# Patient Record
Sex: MALE | Race: BLACK OR AFRICAN AMERICAN
[De-identification: names, ages, dates, MRNs, and addresses within clinical notes are randomized per-mention and may not be internally consistent; named-entity substitution may affect disease eponyms.]

---

## 2018-01-17 ENCOUNTER — HOSPITAL ENCOUNTER (INPATIENT)
Dept: HOSPITAL 74 - YASAS | Age: 63
LOS: 4 days | Discharge: HOME | End: 2018-01-21
Attending: INTERNAL MEDICINE | Admitting: INTERNAL MEDICINE
Payer: COMMERCIAL

## 2018-01-17 VITALS — BODY MASS INDEX: 22.1 KG/M2

## 2018-01-17 DIAGNOSIS — G89.29: ICD-10-CM

## 2018-01-17 DIAGNOSIS — Z91.14: ICD-10-CM

## 2018-01-17 DIAGNOSIS — F10.230: Primary | ICD-10-CM

## 2018-01-17 DIAGNOSIS — F17.213: ICD-10-CM

## 2018-01-17 DIAGNOSIS — F14.20: ICD-10-CM

## 2018-01-17 DIAGNOSIS — I10: ICD-10-CM

## 2018-01-17 DIAGNOSIS — M54.5: ICD-10-CM

## 2018-01-17 LAB
APPEARANCE UR: CLEAR
BILIRUB UR STRIP.AUTO-MCNC: NEGATIVE MG/DL
COLOR UR: YELLOW
KETONES UR QL STRIP: (no result)
LEUKOCYTE ESTERASE UR QL STRIP.AUTO: NEGATIVE
NITRITE UR QL STRIP: NEGATIVE
PH UR: 6 [PH] (ref 5–8)
PROT UR QL STRIP: NEGATIVE
PROT UR QL STRIP: NEGATIVE
RBC # UR STRIP: NEGATIVE /UL
SP GR UR: 1.02 (ref 1–1.03)
UROBILINOGEN UR STRIP-MCNC: (no result) MG/DL (ref 0.2–1)

## 2018-01-17 PROCEDURE — HZ2ZZZZ DETOXIFICATION SERVICES FOR SUBSTANCE ABUSE TREATMENT: ICD-10-PCS | Performed by: INTERNAL MEDICINE

## 2018-01-17 RX ADMIN — Medication SCH MG: at 22:09

## 2018-01-17 RX ADMIN — AMLODIPINE BESYLATE SCH MG: 10 TABLET ORAL at 15:58

## 2018-01-17 RX ADMIN — HYDROCHLOROTHIAZIDE SCH MG: 25 TABLET ORAL at 15:58

## 2018-01-17 NOTE — EKG
Test Reason : 

Blood Pressure : ***/*** mmHG

Vent. Rate : 060 BPM     Atrial Rate : 060 BPM

   P-R Int : 148 ms          QRS Dur : 100 ms

    QT Int : 484 ms       P-R-T Axes : 071 -21 -70 degrees

   QTc Int : 484 ms

 

NORMAL SINUS RHYTHM

POSSIBLE LEFT ATRIAL ENLARGEMENT

LEFT VENTRICULAR HYPERTROPHY

CANNOT RULE OUT ANTEROSEPTAL INFARCT , AGE UNDETERMINED

ABNORMAL ECG

WHEN COMPARED WITH ECG OF 07-NOV-2017 10:02,

MINIMAL CRITERIA FOR ANTEROSEPTAL INFARCT ARE NOW PRESENT

Confirmed by CALEB SETH, VAN ([id number]) on 1/17/2018 6:45:08 PM

 

Referred By:             Confirmed By:VAN ELLIS MD

## 2018-01-17 NOTE — HP
CIWA Score





- CIWA Score


Nausea/Vomitin-No Nausea/No Vomiting


Muscle Tremors: 3


Anxiety: 4-Mod. Anxious/Guarded


Agitation: 0-Normal Activity


Paroxysmal Sweats: 3


Orientation: 0-Oriented


Tacttile Disturbances: 1-Very Mild Itch/Numbness


Auditory Disturbances: 0-None


Visual Disturbances: 2-Mild Sensitivity


Headache: 4-Moderately Severe


CIWA-Ar Total Score: 17





Admission ROS [facility]





- HPI


Chief Complaint: 





"I would like to stop drinking."


Pt. is here to Detox from Alcohol.


Allergies/Adverse Reactions: 


 Allergies











Allergy/AdvReac Type Severity Reaction Status Date / Time


 


No Known Allergies Allergy   Verified 18 11:40











History of Present Illness: 





Pt. is a 63 YO male here to Detox from Alcohol. Patient has had several 

previous Detox Admissions at [facility] in the past (most recent: 2017). Longest 

period of sobriety in recent years: approx. 20 days (2017).


Exam Limitations: No Limitations





- Ebola screening


Have you traveled outside of the country in the last 21 days: No


Have you had contact with anyone from an Ebola affected area: No


Have you been sick,other than usual withdrawal symptoms: No


Do you have a fever: No





- Review of Systems


Constitutional: Chills, Diaphoresis, Fever, Malaise, Night Sweats, Unexplained 

wgt Loss (Lost approx. 10 lbs. over last 2 months.)


EENT: reports: Blurred Vision, Tearing, Nose Congestion, Sinus Pressure


Respiratory: reports: Productive cough


Cardiac: reports: No Symptoms Reported


GI: reports: Nausea, Vomiting, Indigestion


: reports: Frequency (Currently takes HCTZ for HTN.)


Musculoskeletal: reports: Back Pain, Joint Pain, Muscle Pain, Neck Pain, Joint 

Stiffness


Integumentary: reports: No Symptoms Reported


Neuro: reports: Headache, Numbness (Bilateral hands.), Tingling (Bilateral 

hands.), Tremors


Endocrine: reports: No Symptoms Reported


Hematology: reports: Anemia (Not sure about type.)


Psychiatric: reports: Judgement Intact, Mood/Affect Appropiate, Orientated x3, 

Anxious


Other Systems: Reviewed and Negative





Patient History





- Patient Medical History


Hx Anemia: Yes (Uncertin about type; No previous Treatment.)


Hx Asthma: No


Hx Chronic Obstructive Pulmonary Disease (COPD): No


Hx Cancer: No


Hx Cardiac Disorders: No


Hx Congestive Heart Failure: No


Hx Hypertension: Yes (NON COMPLIANT WITH MEDS.)


Hx Hypercholesterolemia: Yes (In past, not taking any current medications.)


Hx Pacemaker: No


HX Cerebrovascular Accident: No


Hx Seizures: No


Hx Dementia: No


Hx Diabetes: No


Hx Gastrointestinal Disorders: No


Hx Liver Disease: No


Hx Genitourinary Disorders: No


Hx Sexually Transmitted Disorders: No


Hx Renal Disease (ESRD): No


Hx Thyroid Disease: No


Hx Human Immunodeficiency Virus (HIV): No (Last Tested: approx. 5 months ago: 

NEGATIVE.)


Hx Hepatitis C: No (Last Tested: approx. 1 month ago: NEGATIVE.)


Hx Depression: No


Hx Suicide Attempt: No (PATIENT DENIES CURRENT SI / HI.)


Hx Bipolar Disorder: No


Hx Schizophrenia: No


Other Medical History: DENIES.





- Patient Surgical History


Past Surgical History: No


Hx Neurologic Surgery: No


Hx Cataract Extraction: No


Hx Cardiac Surgery: No


Hx Lung Surgery: No


Hx Breast Surgery: No


Hx Breast Biopsy: No


Hx Abdominal Surgery: No


Hx Appendectomy: No


Hx Cholecystectomy: No


Hx Genitourinary Surgery: No


Hx  Section: No


Hx Orthopedic Surgery: No


Anesthesia Reaction: No





- PPD History


Previous Implant?: Yes


Documented Results: Negative w/proof


Implanted On Prior [facility] Admission?: Yes


Date: 17


Results: 0 MM


PPD to be Administered?: No





- Reproductive History


Patient is a Female of Child Bearing Age (11 -55 yrs old): No (PATIENT IS MALE.)





- Smoking Cessation


Smoking history: Current every day smoker


Have you smoked in the past 12 months: Yes


Aproximately how many cigarettes per day: 20


Cigars Per Day: 0


Hx Chewing Tobacco Use: No


Initiated information on smoking cessation: Yes


'Breaking Loose' booklet given: 18 (GIVEN TO PATIENT.)





- Substance & Tx. History


Hx Alcohol Use: Yes


Hx Substance Use: Yes


Substance Use Type: Alcohol, Cocaine, Marijuana


Hx Substance Use Treatment: Yes (Previous Detox Admissions at [facility] (most recent

: 2017).)





- Substances Abused


  ** Alcohol


Route: Oral


Frequency: Daily


Amount used: 12 PK BEER / 1 PINT OF VODKA OR RUM


Age of first use: 16


Date of Last Use: 18





  ** Cocaine


Route: Inhalation


Frequency: Daily


Amount used: 2-3 GRAMS


Age of first use: 19


Date of Last Use: 18





  ** Marijuana/Hashish


Route: Smoking


Frequency: Daily


Amount used: 1/8 OF AN OUNCE


Age of first use: 17


Date of Last Use: 18





Family Disease History





- Family Disease History


Family Disease History: Heart Disease: Mother (CVA; .), Other: Mother





Admission Physical Exam [facility]





- Vital Signs


Vital Signs: 


 Vital Signs - 24 hr











  18





  11:25


 


Temperature 96.3 F L


 


Pulse Rate 81


 


Respiratory 19





Rate 


 


Blood Pressure 158/104














- Physical


General Appearance: Yes: No Apparent Distress, Appropriately Dressed, Thin, 

Tremorous


HEENTM: Yes: Hearing grossly Normal, Normocephalic, Normal Voice, COMFORT, Pharynx 

Normal


Respiratory: Yes: Chest Non-Tender, Lungs Clear, No Respiratory Distress, No 

Accessory Muscle Use


Neck: Yes: No masses,lesions,Nodules, Supple, Trachea in good position


Breast: Yes: Breast Exam Deferred


Cardiology: Yes: Regular Rhythm, Regular Rate, S1, S2


Abdominal: Yes: Normal Bowel Sounds, Non Tender, Flat, Soft


Genitourinary: Yes: Within Normal Limits


Back: Yes: Decreased Range of Motion


Musculoskeletal: Yes: Gait Steady, Back pain, Joint Stiffness


Extremities: Yes: Tremors, Other (Swelling noted in Right Hand. Patient reports 

that this started last night. Patient denies any recent injury to hand, arm, or 

neck and he denies any IV drug use in right arm / hand. Patient denies any 

history of similar occurrence. No erythema, bleeding, wounds, unusual discharge

, or signsd of infection noted in right hand or arm. Patient advised to keep 

right arm elevated while lying in bed and to immediately notify medical / 

nursing staff should he notice any change in condition of right hand at any 

time. Patient veralized understanding of recommendations.)


Neurological: Yes: Fully Oriented, Alert, Normal Mood/Affect, Normal Response


Integumentary: Yes: Normal Color, Dry, Warm


Lymphatic: Yes: Within Normal Limits





- Diagnostic


(1) Cocaine dependence, uncomplicated


Current Visit: Yes   Status: Acute   





(2) Alcohol dependence with uncomplicated withdrawal


Current Visit: Yes   Status: Acute   





(3) Nicotine dependence


Current Visit: Yes   Status: Chronic   


Qualifiers: 


   Nicotine product type: cigarettes   Substance use status: uncomplicated   

Qualified Code(s): F17.210 - Nicotine dependence, cigarettes, uncomplicated   





(4) Hypertension


Current Visit: Yes   Status: Chronic   


Qualifiers: 


   Hypertension type: essential hypertension   Qualified Code(s): I10 - 

Essential (primary) hypertension   





Cleared for Admission [facility]





- Detox or Rehab


[facility] Level of Care: Medically Managed


Detox Regimen/Protocol: Librium





[facility] Breath Alcohol Content


Breath Alcohol Content: 0





Urine Drug Screen





- Results


Drug Screen Negative: No


Urine Drug Screen Results: THC-Marijuana, KAYLYNN-Cocaine, BZO-Benzodiazepines

## 2018-01-18 LAB
ALBUMIN SERPL-MCNC: 3.4 G/DL (ref 3.4–5)
ALP SERPL-CCNC: 79 U/L (ref 45–117)
ALT SERPL-CCNC: 50 U/L (ref 12–78)
ANION GAP SERPL CALC-SCNC: 6 MMOL/L (ref 8–16)
AST SERPL-CCNC: 34 U/L (ref 15–37)
BILIRUB SERPL-MCNC: 0.6 MG/DL (ref 0.2–1)
BUN SERPL-MCNC: 18 MG/DL (ref 7–18)
CALCIUM SERPL-MCNC: 8.7 MG/DL (ref 8.5–10.1)
CHLORIDE SERPL-SCNC: 104 MMOL/L (ref 98–107)
CO2 SERPL-SCNC: 32 MMOL/L (ref 21–32)
CREAT SERPL-MCNC: 1.8 MG/DL (ref 0.7–1.3)
DEPRECATED RDW RBC AUTO: 14.1 % (ref 11.9–15.9)
GLUCOSE SERPL-MCNC: 126 MG/DL (ref 74–106)
HCT VFR BLD CALC: 38.1 % (ref 35.4–49)
HGB BLD-MCNC: 12.1 GM/DL (ref 11.7–16.9)
MCH RBC QN AUTO: 26.9 PG (ref 25.7–33.7)
MCHC RBC AUTO-ENTMCNC: 31.8 G/DL (ref 32–35.9)
MCV RBC: 84.6 FL (ref 80–96)
PLATELET # BLD AUTO: 203 K/MM3 (ref 134–434)
PMV BLD: 10.2 FL (ref 7.5–11.1)
POTASSIUM SERPLBLD-SCNC: 3.6 MMOL/L (ref 3.5–5.1)
PROT SERPL-MCNC: 7.4 G/DL (ref 6.4–8.2)
RBC # BLD AUTO: 4.5 M/MM3 (ref 4–5.6)
SODIUM SERPL-SCNC: 142 MMOL/L (ref 136–145)
WBC # BLD AUTO: 3.7 K/MM3 (ref 4–10)

## 2018-01-18 RX ADMIN — HYDROCHLOROTHIAZIDE SCH MG: 25 TABLET ORAL at 10:42

## 2018-01-18 RX ADMIN — Medication SCH TAB: at 10:42

## 2018-01-18 RX ADMIN — AMLODIPINE BESYLATE SCH MG: 10 TABLET ORAL at 10:42

## 2018-01-18 RX ADMIN — LISINOPRIL SCH MG: 10 TABLET ORAL at 10:42

## 2018-01-18 RX ADMIN — Medication SCH MG: at 22:05

## 2018-01-18 NOTE — PN
[facility] CIWA





- CIWA Score


Nausea/Vomitin-No Nausea/No Vomiting


Muscle Tremors: 4-Moderate,w/Arms Extend


Anxiety: 4-Mod. Anxious/Guarded


Agitation: 4-Moderately Restless


Paroxysmal Sweats: 1-Minimal Palms Moist


Orientation: 0-Oriented


Tacttile Disturbances: 3-Moderate Itch/Numb/Burn


Auditory Disturbances: 0-None


Visual Disturbances: 0-None


Headache: 0-None Present


CIWA-Ar Total Score: 16





[facility] Progress Note (SOAP)


Subjective: 





ANXIETY,SWEATS,TREMORS,IRRITABILITY. PT C/O PAIN/SWELLING TO RIGHT HAND X 3 

DAYS. DENIES FALLS OR ANY TRUAMA TO HIS RECOLLECTION.


Objective: 





18 10:37


 Vital Signs











Temperature  97.1 F L  18 09:02


 


Pulse Rate  95 H  18 09:02


 


Respiratory Rate  20   18 09:02


 


Blood Pressure  157/95   18 09:02


 


O2 Sat by Pulse Oximetry (%)      








 Laboratory Last Values











Urine Color  Yellow   18  20:47    


 


Urine Appearance  Clear   18  20:47    


 


Urine pH  6.0  (5.0-8.0)   18  20:47    


 


Ur Specific Gravity  1.021  (1.001-1.035)   18  20:47    


 


Urine Protein  Negative  (NEGATIVE)   18  20:47    


 


Urine Glucose (UA)  Negative  (NEGATIVE)   18  20:47    


 


Urine Ketones  Trace  (NEGATIVE)  H  18  20:47    


 


Urine Blood  Negative  (NEGATIVE)   18  20:47    


 


Urine Nitrite  Negative  (NEGATIVE)   18  20:47    


 


Urine Bilirubin  Negative  (NEGATIVE)   18  20:47    


 


Urine Urobilinogen  4.0 e.u/dl mg/dL (0.2-1.0)   18  20:47    


 


Ur Leukocyte Esterase  Negative  (NEGATIVE)   18  20:47    








OTHER LABS PENDING








RIGHT HAND: SLIGHT SWELLING COMPARED TO LEFT HAND. 





Assessment: 





18 10:38


WITHDRAWAL SX


Plan: 





CONTINUE DETOX


XRAY RIGHT HAND R/O FX

## 2018-01-19 RX ADMIN — Medication SCH TAB: at 10:12

## 2018-01-19 RX ADMIN — AMLODIPINE BESYLATE SCH MG: 10 TABLET ORAL at 10:12

## 2018-01-19 RX ADMIN — LISINOPRIL SCH MG: 10 TABLET ORAL at 10:12

## 2018-01-19 RX ADMIN — Medication SCH MG: at 22:04

## 2018-01-19 RX ADMIN — HYDROCHLOROTHIAZIDE SCH MG: 25 TABLET ORAL at 10:12

## 2018-01-19 NOTE — PN
[facility] CIWA





- CIWA Score


Nausea/Vomitin-No Nausea/No Vomiting


Muscle Tremors: 3


Anxiety: 4-Mod. Anxious/Guarded


Agitation: 3


Paroxysmal Sweats: 1-Minimal Palms Moist


Orientation: 0-Oriented


Tacttile Disturbances: 3-Moderate Itch/Numb/Burn


Auditory Disturbances: 0-None


Visual Disturbances: 0-None


Headache: 0-None Present


CIWA-Ar Total Score: 14





[facility] Progress Note (SOAP)


Subjective: 





C/O BACK PAIN AND RIGHT HAND PAIN. PT DECLINED TO XRAY OF HAND STATING "THERE 

IS NO INJURY.WHY DO I HAVE TO DO XRAY". 


Objective: 





18 12:29


 Vital Signs











Temperature  97.0 F L  18 09:37


 


Pulse Rate  99 H  18 09:37


 


Respiratory Rate  18   18 09:37


 


Blood Pressure  154/102   18 09:37


 


O2 Sat by Pulse Oximetry (%)      








 Laboratory Last Values











WBC  3.7 K/mm3 (4.0-10.0)  L  18  06:00    


 


RBC  4.50 M/mm3 (4.00-5.60)   18  06:00    


 


Hgb  12.1 GM/dL (11.7-16.9)   18  06:00    


 


Hct  38.1 % (35.4-49)   18  06:00    


 


MCV  84.6 fl (80-96)   18  06:00    


 


MCH  26.9 pg (25.7-33.7)   18  06:00    


 


MCHC  31.8 g/dl (32.0-35.9)  L  18  06:00    


 


RDW  14.1 % (11.9-15.9)   18  06:00    


 


Plt Count  203 K/MM3 (134-434)   18  06:00    


 


MPV  10.2 fl (7.5-11.1)   18  06:00    


 


Sodium  142 mmol/L (136-145)   18  06:00    


 


Potassium  3.6 mmol/L (3.5-5.1)   18  06:00    


 


Chloride  104 mmol/L ()   18  06:00    


 


Carbon Dioxide  32 mmol/L (21-32)   18  06:00    


 


Anion Gap  6  (8-16)  L  18  06:00    


 


BUN  18 mg/dL (7-18)   18  06:00    


 


Creatinine  1.8 mg/dL (0.7-1.3)  H  18  06:00    


 


Creat Clearance w eGFR  38.68  (>60)   18  06:00    


 


Random Glucose  126 mg/dL ()  H  18  06:00    


 


Calcium  8.7 mg/dL (8.5-10.1)   18  06:00    


 


Total Bilirubin  0.6 mg/dL (0.2-1.0)  D 18  06:00    


 


AST  34 U/L (15-37)   18  06:00    


 


ALT  50 U/L (12-78)  D 18  06:00    


 


Alkaline Phosphatase  79 U/L ()  D 18  06:00    


 


Total Protein  7.4 g/dl (6.4-8.2)   18  06:00    


 


Albumin  3.4 g/dl (3.4-5.0)   18  06:00    


 


Urine Color  Yellow   18  20:47    


 


Urine Appearance  Clear   18  20:47    


 


Urine pH  6.0  (5.0-8.0)   18  20:47    


 


Ur Specific Gravity  1.021  (1.001-1.035)   18  20:47    


 


Urine Protein  Negative  (NEGATIVE)   18  20:47    


 


Urine Glucose (UA)  Negative  (NEGATIVE)   18  20:47    


 


Urine Ketones  Trace  (NEGATIVE)  H  18  20:47    


 


Urine Blood  Negative  (NEGATIVE)   18  20:47    


 


Urine Nitrite  Negative  (NEGATIVE)   18  20:47    


 


Urine Bilirubin  Negative  (NEGATIVE)   18  20:47    


 


Urine Urobilinogen  4.0 e.u/dl mg/dL (0.2-1.0)   18  20:47    


 


Ur Leukocyte Esterase  Negative  (NEGATIVE)   18  20:47    


 


RPR Titer  Nonreactive  (NONREACTIVE)   18  06:00    











Assessment: 





18 12:29


WITHDRAWAL SX


Plan: 





CONTINUE DETOX


MOTRIN PRN

## 2018-01-20 RX ADMIN — Medication SCH: at 10:52

## 2018-01-20 RX ADMIN — Medication SCH MG: at 22:11

## 2018-01-20 RX ADMIN — HYDROCHLOROTHIAZIDE SCH: 25 TABLET ORAL at 10:52

## 2018-01-20 RX ADMIN — AMLODIPINE BESYLATE SCH: 10 TABLET ORAL at 10:52

## 2018-01-20 RX ADMIN — LISINOPRIL SCH: 10 TABLET ORAL at 10:52

## 2018-01-20 NOTE — PN
[facility]HS Progress Note (SOAP)


Subjective: 





Fatigue, Interrupted Sleep.


Objective: 


PT. A & O X 3, OBSERVED AMBULATING ON UNIT. NO ACUTE DISTRESS.





01/20/18 14:24


 Vital Signs











Temperature  97.8 F   01/20/18 13:14


 


Pulse Rate  93 H  01/20/18 13:14


 


Respiratory Rate  20 01/20/18 13:14


 


Blood Pressure  143/87   01/20/18 13:14


 


O2 Sat by Pulse Oximetry (%)      








 Laboratory Tests











  01/17/18 01/18/18 01/18/18





  20:47 06:00 06:00


 


WBC   3.7 L 


 


RBC   4.50 


 


Hgb   12.1 


 


Hct   38.1 


 


MCV   84.6 


 


MCH   26.9 


 


MCHC   31.8 L 


 


RDW   14.1 


 


Plt Count   203 


 


MPV   10.2 


 


Sodium    142


 


Potassium    3.6


 


Chloride    104


 


Carbon Dioxide    32


 


Anion Gap    6 L


 


BUN    18


 


Creatinine    1.8 H


 


Creat Clearance w eGFR    38.68


 


Random Glucose    126 H


 


Calcium    8.7


 


Total Bilirubin    0.6  D


 


AST    34


 


ALT    50  D


 


Alkaline Phosphatase    79  D


 


Total Protein    7.4


 


Albumin    3.4


 


Urine Color  Yellow  


 


Urine Appearance  Clear  


 


Urine pH  6.0  


 


Ur Specific Gravity  1.021  


 


Urine Protein  Negative  


 


Urine Glucose (UA)  Negative  


 


Urine Ketones  Trace H  


 


Urine Blood  Negative  


 


Urine Nitrite  Negative  


 


Urine Bilirubin  Negative  


 


Urine Urobilinogen  4.0 e.u/dl  


 


Ur Leukocyte Esterase  Negative  


 


RPR Titer   














  01/18/18





  06:00


 


WBC 


 


RBC 


 


Hgb 


 


Hct 


 


MCV 


 


MCH 


 


MCHC 


 


RDW 


 


Plt Count 


 


MPV 


 


Sodium 


 


Potassium 


 


Chloride 


 


Carbon Dioxide 


 


Anion Gap 


 


BUN 


 


Creatinine 


 


Creat Clearance w eGFR 


 


Random Glucose 


 


Calcium 


 


Total Bilirubin 


 


AST 


 


ALT 


 


Alkaline Phosphatase 


 


Total Protein 


 


Albumin 


 


Urine Color 


 


Urine Appearance 


 


Urine pH 


 


Ur Specific Gravity 


 


Urine Protein 


 


Urine Glucose (UA) 


 


Urine Ketones 


 


Urine Blood 


 


Urine Nitrite 


 


Urine Bilirubin 


 


Urine Urobilinogen 


 


Ur Leukocyte Esterase 


 


RPR Titer  Nonreactive








LABS NOTED.


Assessment: 





01/20/18 14:24


WITHDRAWAL SYMPTOMS.


Plan: 





CONTINUE DETOX.


PATIENT ADVISED TO FOLLOW-UP WITH  DR. CORRINA WILLIS AFTER DISCHARGE FROM DETOX 

FOR MEDICAL ASSESSMENT.

## 2018-01-21 VITALS — TEMPERATURE: 97.4 F | HEART RATE: 74 BPM | DIASTOLIC BLOOD PRESSURE: 75 MMHG | SYSTOLIC BLOOD PRESSURE: 144 MMHG

## 2018-01-21 RX ADMIN — HYDROCHLOROTHIAZIDE SCH MG: 25 TABLET ORAL at 09:28

## 2018-01-21 RX ADMIN — LISINOPRIL SCH MG: 10 TABLET ORAL at 09:28

## 2018-01-21 RX ADMIN — Medication SCH TAB: at 09:28

## 2018-01-21 RX ADMIN — AMLODIPINE BESYLATE SCH MG: 10 TABLET ORAL at 09:27

## 2018-01-21 NOTE — DS
[facility] Detox Discharge Summary


Admission Date: 


01/17/18





Discharge Date: 01/21/18





- History


Present History: Alcohol Dependence, Cocaine Dependence


Pertinent Past History: 





HTN





- Physical Exam Results


Vital Signs: 


 Vital Signs











Temperature  97.4 F L  01/21/18 06:00


 


Pulse Rate  74   01/21/18 06:00


 


Respiratory Rate  18   01/21/18 06:00


 


Blood Pressure  144/75   01/21/18 06:00


 


O2 Sat by Pulse Oximetry (%)      











Pertinent Admission Physical Exam Findings: 





Withdrawal symptoms





- Treatment


Hospital Course: Detox Protocol Followed, Detoxed Safely, Responded well, 

Discharged Condition Good





- Medication


Discharge Medications: 


Ambulatory Orders





Amlodipine Besylate [Norvasc -] 10 mg PO DAILY #30 tablet 06/25/16 


Hydrochlorothiazide [Hctz -] 25 mg PO DAILY #30 tablet 06/25/16 











- Diagnosis


(1) Alcohol dependence with uncomplicated withdrawal


Status: Acute   





(2) Cocaine dependence, uncomplicated


Status: Chronic   





(3) Nicotine dependence


Status: Chronic   


Qualifiers: 


   Nicotine product type: cigarettes   Substance use status: in withdrawal   

Qualified Code(s): F17.213 - Nicotine dependence, cigarettes, with withdrawal   





(4) Hypertension


Status: Chronic   


Qualifiers: 


   Hypertension type: essential hypertension   Qualified Code(s): I10 - 

Essential (primary) hypertension   





- AMA


Did Patient Leave Against Medical Advice: No (F/U with PCP within 1-2 weeks)

## 2018-02-19 ENCOUNTER — HOSPITAL ENCOUNTER (INPATIENT)
Dept: HOSPITAL 74 - YASAS | Age: 63
LOS: 21 days | Discharge: HOME | DRG: 772 | End: 2018-03-12
Attending: PSYCHIATRY & NEUROLOGY | Admitting: PSYCHIATRY & NEUROLOGY
Payer: COMMERCIAL

## 2018-02-19 VITALS — BODY MASS INDEX: 21.4 KG/M2

## 2018-02-19 DIAGNOSIS — Z91.14: ICD-10-CM

## 2018-02-19 DIAGNOSIS — F17.210: ICD-10-CM

## 2018-02-19 DIAGNOSIS — G89.29: ICD-10-CM

## 2018-02-19 DIAGNOSIS — M79.602: ICD-10-CM

## 2018-02-19 DIAGNOSIS — F14.20: ICD-10-CM

## 2018-02-19 DIAGNOSIS — F12.20: ICD-10-CM

## 2018-02-19 DIAGNOSIS — I10: ICD-10-CM

## 2018-02-19 DIAGNOSIS — F10.20: Primary | ICD-10-CM

## 2018-02-19 DIAGNOSIS — M54.5: ICD-10-CM

## 2018-02-19 DIAGNOSIS — Z48.01: ICD-10-CM

## 2018-02-19 DIAGNOSIS — E78.5: ICD-10-CM

## 2018-02-19 LAB
APPEARANCE UR: CLEAR
BILIRUB UR STRIP.AUTO-MCNC: NEGATIVE MG/DL
COLOR UR: YELLOW
KETONES UR QL STRIP: NEGATIVE
LEUKOCYTE ESTERASE UR QL STRIP.AUTO: NEGATIVE
NITRITE UR QL STRIP: NEGATIVE
PH UR: 6 [PH] (ref 5–8)
PROT UR QL STRIP: NEGATIVE
PROT UR QL STRIP: NEGATIVE
RBC # UR STRIP: NEGATIVE /UL
SP GR UR: 1.02 (ref 1–1.03)
UROBILINOGEN UR STRIP-MCNC: NEGATIVE MG/DL (ref 0.2–1)

## 2018-02-19 PROCEDURE — HZ42ZZZ GROUP COUNSELING FOR SUBSTANCE ABUSE TREATMENT, COGNITIVE-BEHAVIORAL: ICD-10-PCS | Performed by: PSYCHIATRY & NEUROLOGY

## 2018-02-19 RX ADMIN — AMLODIPINE BESYLATE SCH MG: 5 TABLET ORAL at 19:21

## 2018-02-19 RX ADMIN — LIDOCAINE SCH PATCH: 50 PATCH TOPICAL at 19:22

## 2018-02-19 RX ADMIN — Medication SCH EACH: at 21:58

## 2018-02-19 RX ADMIN — Medication SCH MG: at 21:29

## 2018-02-19 RX ADMIN — HYDROCHLOROTHIAZIDE SCH MG: 25 TABLET ORAL at 19:21

## 2018-02-19 RX ADMIN — CYCLOBENZAPRINE HYDROCHLORIDE SCH MG: 10 TABLET, FILM COATED ORAL at 19:21

## 2018-02-19 RX ADMIN — NICOTINE SCH: 21 PATCH TRANSDERMAL at 19:22

## 2018-02-19 RX ADMIN — CYCLOBENZAPRINE HYDROCHLORIDE SCH MG: 10 TABLET, FILM COATED ORAL at 21:29

## 2018-02-19 NOTE — HP
Admission [facility]





- [facility]


Chief Complaint: 





[facility] where he was detoxecd


Allergies/Adverse Reactions: 


 Allergies











Allergy/AdvReac Type Severity Reaction Status Date / Time


 


No Known Allergies Allergy   Verified 18 09:57











Exam Limitations: No Limitations





- Ebola screening


Have you traveled outside of the country in the last 21 days: No


Have you had contact with anyone from an Ebola affected area: No


Have you been sick,other than usual withdrawal symptoms: No


Do you have a fever: No





- Review of Systems


Constitutional: No Symptoms Reported


EENT: reports: No Symptoms Reported


Respiratory: reports: No Symptoms reported


Cardiac: reports: No Symptoms Reported


GI: reports: No Symptoms Reported


: reports: No Symptoms Reported


Musculoskeletal: reports: No Symptoms Reported


Integumentary: reports: No Symptoms Reported


Neuro: reports: No Symptoms reported


Endocrine: reports: No Symptoms Reported


Hematology: reports: No Symptoms Reported


Psychiatric: reports: Judgement Intact, Mood/Affect Appropiate, Orientated x3, 

Anxious, Depressed


Other Systems: Reviewed and Negative





Patient History





- Patient Medical History


Hx Anemia: Yes (Uncertin about type; No previous Treatment.)


Hx Asthma: No


Hx Chronic Obstructive Pulmonary Disease (COPD): No


Hx Cancer: No


Hx Cardiac Disorders: No


Hx Congestive Heart Failure: No


Hx Hypertension: Yes (NON COMPLIANT WITH MEDS.)


Hx Hypercholesterolemia: Yes (In past, not taking any current medications.)


Hx Pacemaker: No


HX Cerebrovascular Accident: No


Hx Seizures: No


Hx Dementia: No


Hx Diabetes: No


Hx Gastrointestinal Disorders: No


Hx Liver Disease: No


Hx Genitourinary Disorders: No


Hx Sexually Transmitted Disorders: No


Hx Renal Disease (ESRD): No


Hx Thyroid Disease: No


Hx Human Immunodeficiency Virus (HIV): No (Last Tested: approx. 5 months ago: 

NEGATIVE.)


Hx Hepatitis C: No (Last Tested: approx. 1 month ago: NEGATIVE.)


Hx Depression: No


Hx Suicide Attempt: No (PATIENT DENIES CURRENT SI / HI.)


Hx Bipolar Disorder: No


Hx Schizophrenia: No





- Patient Surgical History


Past Surgical History: No


Hx Neurologic Surgery: No


Hx Cataract Extraction: No


Hx Cardiac Surgery: No


Hx Lung Surgery: No


Hx Breast Surgery: No


Hx Breast Biopsy: No


Hx Abdominal Surgery: No


Hx Appendectomy: No


Hx Cholecystectomy: No


Hx Genitourinary Surgery: No


Hx  Section: No


Hx Orthopedic Surgery: No


Anesthesia Reaction: No





- PPD History


Previous Implant?: Yes


Date: 17


Results: 0 MM


PPD to be Administered?: No





- Reproductive History


Patient is a Female of Child Bearing Age (11 -55 yrs old): No


Patient Pregnant: No





- Smoking Cessation


Smoking history: Current every day smoker


Have you smoked in the past 12 months: Yes


Aproximately how many cigarettes per day: 20


Cigars Per Day: 0


Hx Chewing Tobacco Use: No


Initiated information on smoking cessation: Yes


'Breaking Loose' booklet given: 18





- Substance & Tx. History


Hx Alcohol Use: Yes


Hx Substance Use: Yes


Substance Use Type: Alcohol, Cocaine, Marijuana, Opiates, Prescribed, 

Tranquilizers


Hx Substance Use Treatment: Yes





- Substances Abused


  ** Cocaine


Route: Inhalation


Frequency: Daily


Amount used: 1g daily


Age of first use: 25


Date of Last Use: 20





  ** Marijuana/Hashish


Route: Smoking


Frequency: Daily


Amount used: 2g


Age of first use: 22


Date of Last Use: 18





  ** Alcohol


Route: Oral


Frequency: Daily


Amount used: 1 pint spirits


Age of first use: 22


Date of Last Use: 02/15/18





Family Disease History





- Family Disease History


Family Disease History: Heart Disease: Mother (CVA; .), Other: Mother





Admission Physical Exam [facility]





- Vital Signs


Vital Signs: 


 Vital Signs - 24 hr











  18





  09:22


 


Temperature 95.3 F L


 


Pulse Rate 68


 


Respiratory 20





Rate 


 


Blood Pressure 145/88














- Physical


General Appearance: Yes: Within Normal Limits, No Apparent Distress, Nourished, 

Appropriately Dressed


HEENTM: Yes: Within Normal Limits, EOMI, Hearing grossly Normal, Normal ENT 

Inspection, Normocephalic, Normal Voice, COMFORT, Pharynx Normal


Respiratory: Yes: Within Normal Limits, Chest Non-Tender, Lungs Clear, Normal 

Breath Sounds, No Respiratory Distress, No Accessory Muscle Use


Neck: Yes: Within Normal Limits, No masses,lesions,Nodules, Supple, Trachea in 

good position


Breast: Yes: Breast Exam Deferred


Cardiology: Yes: Within Normal Limits, Regular Rhythm, Regular Rate, S1, S2


Abdominal: Yes: Within Normal Limits, Normal Bowel Sounds, Non Tender, Flat, 

Soft


Genitourinary: Yes: Within Normal Limits


Back: Yes: Within Normal Limits, Normal Inspection


Musculoskeletal: Yes: Within Normal Limits, full range of Motion, Gait Steady, 

Pelvis Stable


Extremities: Yes: Within Normal Limits, Normal Capillary Refill, Normal 

Inspection, Normal Range of Motion, Non-Tender


Neurological: Yes: CNs II-XII NML intact, Fully Oriented, Alert, Motor Strength 

5/5, Normal Response, Depressed Affect


Integumentary: Yes: Within Normal Limits, Normal Color, Dry, Warm


Lymphatic: Yes: Within Normal Limits





- Diagnostic


(1) Cannabis dependence


Current Visit: Yes   Status: Acute   





(2) Alcohol dependence


Current Visit: Yes   Status: Acute   





(3) Chronic low back pain


Current Visit: No   Status: Chronic   





(4) Cocaine dependence, uncomplicated


Current Visit: No   Status: Chronic   





(5) Hypertension


Current Visit: No   Status: Chronic   


Qualifiers: 


 





(6) Nicotine dependence


Current Visit: No   Status: Chronic   


Qualifiers: 


 





Cleared for Admission [facility]





- Detox or Rehab


Claeared for Rehab Admission: Yes





[facility] Breath Alcohol Content


Breath Alcohol Content: 0





Urine Drug Screen





- Results


Drug Screen Negative: No


Urine Drug Screen Results: THC-Marijuana, KAYLYNN-Cocaine, BZO-Benzodiazepines





Inpatient Rehab Admission





- Initial Determination


Are CD services needed?: Yes


Free of communicable disease: Yes


Not in need of hospitalization: Yes





- Rehab Admission Criteria


Comorbidities: Yes


Lacks judgement: Yes


Patient is meeting Inpatient Rehab admission criteria:: Yes

## 2018-02-20 LAB
ALBUMIN SERPL-MCNC: 2.8 G/DL (ref 3.4–5)
ALP SERPL-CCNC: 129 U/L (ref 45–117)
ALT SERPL-CCNC: 29 U/L (ref 12–78)
ANION GAP SERPL CALC-SCNC: 5 MMOL/L (ref 8–16)
AST SERPL-CCNC: 22 U/L (ref 15–37)
BILIRUB SERPL-MCNC: 0.2 MG/DL (ref 0.2–1)
BUN SERPL-MCNC: 28 MG/DL (ref 7–18)
CALCIUM SERPL-MCNC: 7.9 MG/DL (ref 8.5–10.1)
CHLORIDE SERPL-SCNC: 108 MMOL/L (ref 98–107)
CO2 SERPL-SCNC: 30 MMOL/L (ref 21–32)
CREAT SERPL-MCNC: 1.7 MG/DL (ref 0.7–1.3)
DEPRECATED RDW RBC AUTO: 14.1 % (ref 11.9–15.9)
GLUCOSE SERPL-MCNC: 103 MG/DL (ref 74–106)
HCT VFR BLD CALC: 34.3 % (ref 35.4–49)
HGB BLD-MCNC: 11.1 GM/DL (ref 11.7–16.9)
MCH RBC QN AUTO: 27.5 PG (ref 25.7–33.7)
MCHC RBC AUTO-ENTMCNC: 32.4 G/DL (ref 32–35.9)
MCV RBC: 84.6 FL (ref 80–96)
PLATELET # BLD AUTO: 190 K/MM3 (ref 134–434)
PMV BLD: 9.8 FL (ref 7.5–11.1)
POTASSIUM SERPLBLD-SCNC: 3.8 MMOL/L (ref 3.5–5.1)
PROT SERPL-MCNC: 6.3 G/DL (ref 6.4–8.2)
RBC # BLD AUTO: 4.05 M/MM3 (ref 4–5.6)
SODIUM SERPL-SCNC: 143 MMOL/L (ref 136–145)
WBC # BLD AUTO: 3.9 K/MM3 (ref 4–10)

## 2018-02-20 RX ADMIN — Medication SCH MG: at 21:24

## 2018-02-20 RX ADMIN — AMLODIPINE BESYLATE SCH MG: 5 TABLET ORAL at 10:07

## 2018-02-20 RX ADMIN — CYCLOBENZAPRINE HYDROCHLORIDE SCH MG: 10 TABLET, FILM COATED ORAL at 14:20

## 2018-02-20 RX ADMIN — Medication SCH TAB: at 10:07

## 2018-02-20 RX ADMIN — LIDOCAINE SCH PATCH: 50 PATCH TOPICAL at 10:07

## 2018-02-20 RX ADMIN — CYCLOBENZAPRINE HYDROCHLORIDE SCH MG: 10 TABLET, FILM COATED ORAL at 06:37

## 2018-02-20 RX ADMIN — NICOTINE SCH: 21 PATCH TRANSDERMAL at 10:07

## 2018-02-20 RX ADMIN — Medication SCH EACH: at 21:24

## 2018-02-20 RX ADMIN — HYDROCHLOROTHIAZIDE SCH MG: 25 TABLET ORAL at 10:07

## 2018-02-20 RX ADMIN — CYCLOBENZAPRINE HYDROCHLORIDE SCH MG: 10 TABLET, FILM COATED ORAL at 21:24

## 2018-02-20 NOTE — EKG
Test Reason : 

Blood Pressure : ***/*** mmHG

Vent. Rate : 073 BPM     Atrial Rate : 073 BPM

   P-R Int : 152 ms          QRS Dur : 092 ms

    QT Int : 424 ms       P-R-T Axes : 068 -13 -73 degrees

   QTc Int : 467 ms

 

NORMAL SINUS RHYTHM

POSSIBLE LEFT ATRIAL ENLARGEMENT

LEFT VENTRICULAR HYPERTROPHY

CANNOT RULE OUT SEPTAL INFARCT (CITED ON OR BEFORE 06-NOV-2017)

T WAVE ABNORMALITY, CONSIDER INFEROLATERAL ISCHEMIA

ABNORMAL ECG

 

Confirmed by Ananda Tran MD ([id number]) on 2/20/2018 8:57:36 AM

 

Referred By:             Confirmed By:Ananda Tran MD

## 2018-02-20 NOTE — HP
Psychiatrist Admission





- Data


Date of interview: 02/20/18


Admission source: [facility]


Identifying data: This is the first [facility] inpatient rehabilitation admission 

for this 62 year old AA male who is single unemployed and dimiciled.


Medical History: HTN, HLP and chronic back pain, smokes cigaretets 20 a day.


Psychiatric History: Patient denies history of psychiatric treatment.


Physical/Sexual Abuse/Trauma History: Patient denies


Vital Signs: 


 Vital Signs - 24 hr











  02/20/18 02/20/18 02/20/18





  00:30 03:30 06:48


 


Temperature   97.2 F L


 


Pulse Rate   83


 


Respiratory 20 18 18





Rate   


 


Blood Pressure   145/94














  02/20/18





  10:00


 


Temperature 


 


Pulse Rate 84


 


Respiratory 18





Rate 


 


Blood Pressure 161/103











Allergies/Adverse Reactions: 


 Allergies











Allergy/AdvReac Type Severity Reaction Status Date / Time


 


No Known Allergies Allergy   Verified 02/19/18 09:57











Date of last physical exam: 02/19/18


Concur with the findings of this exam: Yes





- Substance Abuse/Tx History


Hx Alcohol Use: Yes (age of first use 22, daily 1 pint od spirits)


Hx Substance Use: Yes


Substance Use Type: Cocaine (started at age of 25, daily use 1 gr), Marijuana (

started at age of 22, daily 2 gr)


Hx Substance Use Treatment: Yes (this is the first rehablitation treatment.)





Mental Status Exam





- Mental Status Exam


Alert and Oriented to: Time, Place, Person


Cognitive Function: Grossly Intact


Patient Appearance: Well Groomed


Mood: Angry, Irritable


Affect: Appropriate, Mood Congruent


Patient Behavior: Cooperative


Speech Pattern: Clear, Appropriate


Voice Loudness: Normal


Thought Process: Intact


Thought Disorder: Not Present


Hallucinations: Denies


Suicidal Ideation: Denies


Homicidal Ideation: Denies


Insight/Judgement: Fair


Sleep: Fair


Appetite: Fair


Muscle strength/Tone: Normal


Gait/Station: Other





Psychiatric Findings





- Problem List (Axis 1, 2,3)


(1) Cocaine dependence


Current Visit: Yes   Status: Acute   





(2) Alcohol dependence


Current Visit: Yes   Status: Acute   





(3) Cannabis dependence


Current Visit: Yes   Status: Acute   





(4) Nicotine dependence


Current Visit: No   Status: Chronic   


Qualifiers: 


 





- Initial Treatment Plan


Initial Treatment Plan: Group and  supportive therapy ,will monitor progress as 

needed.

## 2018-02-21 RX ADMIN — AMLODIPINE BESYLATE SCH MG: 5 TABLET ORAL at 10:09

## 2018-02-21 RX ADMIN — LISINOPRIL SCH MG: 10 TABLET ORAL at 16:52

## 2018-02-21 RX ADMIN — CYCLOBENZAPRINE HYDROCHLORIDE PRN MG: 10 TABLET, FILM COATED ORAL at 21:27

## 2018-02-21 RX ADMIN — LIDOCAINE SCH PATCH: 50 PATCH TOPICAL at 10:10

## 2018-02-21 RX ADMIN — Medication SCH TAB: at 10:09

## 2018-02-21 RX ADMIN — CYCLOBENZAPRINE HYDROCHLORIDE SCH: 10 TABLET, FILM COATED ORAL at 14:34

## 2018-02-21 RX ADMIN — Medication SCH EACH: at 21:27

## 2018-02-21 RX ADMIN — HYDROCHLOROTHIAZIDE SCH: 25 TABLET ORAL at 10:09

## 2018-02-21 RX ADMIN — Medication SCH MG: at 21:26

## 2018-02-21 RX ADMIN — CYCLOBENZAPRINE HYDROCHLORIDE SCH: 10 TABLET, FILM COATED ORAL at 06:32

## 2018-02-21 RX ADMIN — AMLODIPINE BESYLATE SCH MG: 10 TABLET ORAL at 16:52

## 2018-02-21 RX ADMIN — CYCLOBENZAPRINE HYDROCHLORIDE SCH MG: 10 TABLET, FILM COATED ORAL at 06:30

## 2018-02-21 RX ADMIN — NICOTINE SCH: 21 PATCH TRANSDERMAL at 10:10

## 2018-02-22 RX ADMIN — AMLODIPINE BESYLATE SCH MG: 10 TABLET ORAL at 10:21

## 2018-02-22 RX ADMIN — NICOTINE SCH: 21 PATCH TRANSDERMAL at 10:21

## 2018-02-22 RX ADMIN — LISINOPRIL SCH MG: 10 TABLET ORAL at 10:19

## 2018-02-22 RX ADMIN — Medication SCH TAB: at 10:19

## 2018-02-22 RX ADMIN — LIDOCAINE SCH: 50 PATCH TOPICAL at 10:21

## 2018-02-22 RX ADMIN — Medication SCH EACH: at 21:27

## 2018-02-22 RX ADMIN — Medication SCH MG: at 21:27

## 2018-02-23 RX ADMIN — BACITRACIN ZINC SCH GM: 500 OINTMENT TOPICAL at 16:50

## 2018-02-23 RX ADMIN — CYCLOBENZAPRINE HYDROCHLORIDE PRN MG: 10 TABLET, FILM COATED ORAL at 21:25

## 2018-02-23 RX ADMIN — Medication SCH EACH: at 21:26

## 2018-02-23 RX ADMIN — NICOTINE SCH: 21 PATCH TRANSDERMAL at 10:15

## 2018-02-23 RX ADMIN — Medication SCH TAB: at 10:16

## 2018-02-23 RX ADMIN — LIDOCAINE SCH: 50 PATCH TOPICAL at 10:15

## 2018-02-23 RX ADMIN — AMLODIPINE BESYLATE SCH MG: 10 TABLET ORAL at 10:16

## 2018-02-23 RX ADMIN — BACITRACIN ZINC SCH GM: 500 OINTMENT TOPICAL at 22:01

## 2018-02-23 RX ADMIN — LISINOPRIL SCH MG: 5 TABLET ORAL at 10:16

## 2018-02-23 RX ADMIN — Medication SCH MG: at 21:25

## 2018-02-23 NOTE — PN
[facility]HS Progress Note


Note: 


Patient evaluated for dressing placed 2/19/18  on the right groin and needs 

changing. As per patient  he had cardiac cath done 1 1/2 week ago, reports 

initial surgical dressing was changed by the surgeon. 


Patient AO x 3, ambulating


Skin is clean and intact, with mild dirt, no signs and symptoms of infection 

present. 





Bacitracin BID TOP 


Continue to monitor

## 2018-02-24 RX ADMIN — NICOTINE SCH: 21 PATCH TRANSDERMAL at 10:17

## 2018-02-24 RX ADMIN — Medication SCH: at 22:14

## 2018-02-24 RX ADMIN — BACITRACIN ZINC SCH: 500 OINTMENT TOPICAL at 22:13

## 2018-02-24 RX ADMIN — LISINOPRIL SCH MG: 5 TABLET ORAL at 10:18

## 2018-02-24 RX ADMIN — AMLODIPINE BESYLATE SCH MG: 10 TABLET ORAL at 10:17

## 2018-02-24 RX ADMIN — BACITRACIN ZINC SCH: 500 OINTMENT TOPICAL at 10:17

## 2018-02-24 RX ADMIN — Medication SCH TAB: at 10:18

## 2018-02-24 RX ADMIN — LIDOCAINE SCH: 50 PATCH TOPICAL at 11:28

## 2018-02-24 RX ADMIN — Medication SCH MG: at 21:42

## 2018-02-25 RX ADMIN — NICOTINE SCH: 21 PATCH TRANSDERMAL at 09:53

## 2018-02-25 RX ADMIN — Medication SCH MG: at 21:42

## 2018-02-25 RX ADMIN — AMLODIPINE BESYLATE SCH MG: 10 TABLET ORAL at 09:52

## 2018-02-25 RX ADMIN — GABAPENTIN SCH MG: 100 CAPSULE ORAL at 21:42

## 2018-02-25 RX ADMIN — BACITRACIN ZINC SCH GM: 500 OINTMENT TOPICAL at 21:41

## 2018-02-25 RX ADMIN — Medication SCH TAB: at 09:53

## 2018-02-25 RX ADMIN — Medication SCH: at 21:42

## 2018-02-25 RX ADMIN — LIDOCAINE SCH PATCH: 50 PATCH TOPICAL at 09:52

## 2018-02-25 RX ADMIN — GABAPENTIN SCH MG: 100 CAPSULE ORAL at 14:17

## 2018-02-25 RX ADMIN — BACITRACIN ZINC SCH: 500 OINTMENT TOPICAL at 09:53

## 2018-02-25 RX ADMIN — LISINOPRIL SCH MG: 5 TABLET ORAL at 09:52

## 2018-02-25 NOTE — PN
BHS Progress Note (SOAP)


Subjective: 





old left arm pain , old associated with nmbness and stabbing pain.  no h/o 

injury


Objective: 





02/25/18 12:05


 Vital Signs - 8 hr











  02/25/18 02/25/18





  07:04 10:00


 


Temperature 96.3 F L 


 


Pulse Rate 94 H 89


 


Respiratory 18 18





Rate  


 


Blood Pressure 143/74 147/93








 Laboratory Tests











  02/19/18 02/19/18 02/19/18





  08:20 08:20 08:20


 


WBC  3.9 L  


 


RBC  4.05  


 


Hgb  11.1 L  


 


Hct  34.3 L  


 


MCV  84.6  


 


MCH  27.5  


 


MCHC  32.4  


 


RDW  14.1  


 


Plt Count  190  


 


MPV  9.8  


 


Sodium   143 


 


Potassium   3.8 


 


Chloride   108 H 


 


Carbon Dioxide   30 


 


Anion Gap   5 L 


 


BUN   28 H D 


 


Creatinine   1.7 H 


 


Creat Clearance w eGFR   41.32 


 


Random Glucose   103 


 


Calcium   7.9 L 


 


Total Bilirubin   0.2  D 


 


AST   22  D 


 


ALT   29  D 


 


Alkaline Phosphatase   129 H D 


 


Total Protein   6.3 L 


 


Albumin   2.8 L 


 


Urine Color   


 


Urine Appearance   


 


Urine pH   


 


Ur Specific Gravity   


 


Urine Protein   


 


Urine Glucose (UA)   


 


Urine Ketones   


 


Urine Blood   


 


Urine Nitrite   


 


Urine Bilirubin   


 


Urine Urobilinogen   


 


Ur Leukocyte Esterase   


 


RPR Titer    Nonreactive














  02/19/18





  18:29


 


WBC 


 


RBC 


 


Hgb 


 


Hct 


 


MCV 


 


MCH 


 


MCHC 


 


RDW 


 


Plt Count 


 


MPV 


 


Sodium 


 


Potassium 


 


Chloride 


 


Carbon Dioxide 


 


Anion Gap 


 


BUN 


 


Creatinine 


 


Creat Clearance w eGFR 


 


Random Glucose 


 


Calcium 


 


Total Bilirubin 


 


AST 


 


ALT 


 


Alkaline Phosphatase 


 


Total Protein 


 


Albumin 


 


Urine Color  Yellow


 


Urine Appearance  Clear


 


Urine pH  6.0


 


Ur Specific Gravity  1.021


 


Urine Protein  Negative


 


Urine Glucose (UA)  Negative


 


Urine Ketones  Negative


 


Urine Blood  Negative


 


Urine Nitrite  Negative


 


Urine Bilirubin  Negative


 


Urine Urobilinogen  Negative


 


Ur Leukocyte Esterase  Negative


 


RPR Titer 








numb left arm, decreawsed range of mothion, no deformity or inection noted


Assessment: 





02/25/18 12:06


neurotpathic degenerative pain left arm - will start neurontin, elavil fo deonte, f

/u PCP when dischargeed

## 2018-02-26 LAB
ANION GAP SERPL CALC-SCNC: 6 MMOL/L (ref 8–16)
BUN SERPL-MCNC: 31 MG/DL (ref 7–18)
CALCIUM SERPL-MCNC: 8.5 MG/DL (ref 8.5–10.1)
CHLORIDE SERPL-SCNC: 104 MMOL/L (ref 98–107)
CO2 SERPL-SCNC: 30 MMOL/L (ref 21–32)
CREAT SERPL-MCNC: 1.5 MG/DL (ref 0.7–1.3)
GLUCOSE SERPL-MCNC: 111 MG/DL (ref 74–106)
POTASSIUM SERPLBLD-SCNC: 4.1 MMOL/L (ref 3.5–5.1)
SODIUM SERPL-SCNC: 140 MMOL/L (ref 136–145)

## 2018-02-26 RX ADMIN — AMITRIPTYLINE HYDROCHLORIDE SCH MG: 25 TABLET, FILM COATED ORAL at 21:28

## 2018-02-26 RX ADMIN — Medication SCH MG: at 21:27

## 2018-02-26 RX ADMIN — BACITRACIN ZINC SCH GM: 500 OINTMENT TOPICAL at 21:28

## 2018-02-26 RX ADMIN — Medication SCH TAB: at 10:34

## 2018-02-26 RX ADMIN — Medication SCH EACH: at 21:28

## 2018-02-26 RX ADMIN — LIDOCAINE SCH PATCH: 50 PATCH TOPICAL at 10:35

## 2018-02-26 RX ADMIN — NICOTINE SCH: 21 PATCH TRANSDERMAL at 10:34

## 2018-02-26 RX ADMIN — GABAPENTIN SCH MG: 100 CAPSULE ORAL at 06:03

## 2018-02-26 RX ADMIN — BACITRACIN ZINC SCH GM: 500 OINTMENT TOPICAL at 10:34

## 2018-02-26 RX ADMIN — GABAPENTIN SCH MG: 100 CAPSULE ORAL at 21:27

## 2018-02-26 RX ADMIN — AMLODIPINE BESYLATE SCH MG: 10 TABLET ORAL at 10:34

## 2018-02-26 RX ADMIN — CYCLOBENZAPRINE HYDROCHLORIDE PRN MG: 10 TABLET, FILM COATED ORAL at 21:27

## 2018-02-26 RX ADMIN — GABAPENTIN SCH MG: 100 CAPSULE ORAL at 14:46

## 2018-02-26 RX ADMIN — LISINOPRIL SCH MG: 5 TABLET ORAL at 10:34

## 2018-02-27 RX ADMIN — Medication SCH EACH: at 21:39

## 2018-02-27 RX ADMIN — Medication SCH MG: at 21:38

## 2018-02-27 RX ADMIN — NICOTINE SCH: 14 PATCH, EXTENDED RELEASE TRANSDERMAL at 15:34

## 2018-02-27 RX ADMIN — GABAPENTIN SCH MG: 100 CAPSULE ORAL at 21:38

## 2018-02-27 RX ADMIN — CYCLOBENZAPRINE HYDROCHLORIDE PRN MG: 10 TABLET, FILM COATED ORAL at 21:38

## 2018-02-27 RX ADMIN — AMLODIPINE BESYLATE SCH MG: 10 TABLET ORAL at 10:11

## 2018-02-27 RX ADMIN — GABAPENTIN SCH MG: 100 CAPSULE ORAL at 06:16

## 2018-02-27 RX ADMIN — NICOTINE SCH: 21 PATCH TRANSDERMAL at 10:12

## 2018-02-27 RX ADMIN — BACITRACIN ZINC SCH GM: 500 OINTMENT TOPICAL at 21:38

## 2018-02-27 RX ADMIN — BACITRACIN ZINC SCH GM: 500 OINTMENT TOPICAL at 10:11

## 2018-02-27 RX ADMIN — AMITRIPTYLINE HYDROCHLORIDE SCH MG: 25 TABLET, FILM COATED ORAL at 21:38

## 2018-02-27 RX ADMIN — GABAPENTIN SCH MG: 100 CAPSULE ORAL at 14:34

## 2018-02-27 RX ADMIN — LISINOPRIL SCH MG: 5 TABLET ORAL at 10:11

## 2018-02-27 RX ADMIN — LIDOCAINE SCH: 50 PATCH TOPICAL at 10:12

## 2018-02-27 RX ADMIN — Medication SCH TAB: at 10:11

## 2018-02-28 RX ADMIN — GABAPENTIN SCH MG: 100 CAPSULE ORAL at 21:26

## 2018-02-28 RX ADMIN — Medication SCH EACH: at 21:26

## 2018-02-28 RX ADMIN — AMLODIPINE BESYLATE SCH MG: 10 TABLET ORAL at 10:03

## 2018-02-28 RX ADMIN — BACITRACIN ZINC SCH: 500 OINTMENT TOPICAL at 10:03

## 2018-02-28 RX ADMIN — GABAPENTIN SCH MG: 100 CAPSULE ORAL at 06:57

## 2018-02-28 RX ADMIN — GABAPENTIN SCH: 100 CAPSULE ORAL at 14:25

## 2018-02-28 RX ADMIN — CYCLOBENZAPRINE HYDROCHLORIDE PRN MG: 10 TABLET, FILM COATED ORAL at 21:26

## 2018-02-28 RX ADMIN — Medication SCH MG: at 21:26

## 2018-02-28 RX ADMIN — AMITRIPTYLINE HYDROCHLORIDE SCH MG: 25 TABLET, FILM COATED ORAL at 21:26

## 2018-02-28 RX ADMIN — NICOTINE SCH: 14 PATCH, EXTENDED RELEASE TRANSDERMAL at 10:03

## 2018-02-28 RX ADMIN — Medication SCH TAB: at 10:03

## 2018-02-28 RX ADMIN — LISINOPRIL SCH MG: 10 TABLET ORAL at 10:02

## 2018-02-28 RX ADMIN — LIDOCAINE SCH: 50 PATCH TOPICAL at 10:03

## 2018-02-28 RX ADMIN — BACITRACIN ZINC SCH GM: 500 OINTMENT TOPICAL at 21:26

## 2018-03-01 RX ADMIN — AMLODIPINE BESYLATE SCH MG: 10 TABLET ORAL at 10:00

## 2018-03-01 RX ADMIN — GABAPENTIN SCH MG: 100 CAPSULE ORAL at 06:28

## 2018-03-01 RX ADMIN — BACITRACIN ZINC SCH GM: 500 OINTMENT TOPICAL at 10:00

## 2018-03-01 RX ADMIN — LISINOPRIL SCH MG: 10 TABLET ORAL at 09:59

## 2018-03-01 RX ADMIN — Medication SCH MG: at 21:46

## 2018-03-01 RX ADMIN — Medication SCH: at 22:16

## 2018-03-01 RX ADMIN — GABAPENTIN SCH MG: 100 CAPSULE ORAL at 14:32

## 2018-03-01 RX ADMIN — NICOTINE SCH: 14 PATCH, EXTENDED RELEASE TRANSDERMAL at 10:00

## 2018-03-01 RX ADMIN — Medication SCH TAB: at 09:59

## 2018-03-01 RX ADMIN — GABAPENTIN SCH MG: 100 CAPSULE ORAL at 21:46

## 2018-03-01 RX ADMIN — AMITRIPTYLINE HYDROCHLORIDE SCH MG: 25 TABLET, FILM COATED ORAL at 21:46

## 2018-03-01 RX ADMIN — BACITRACIN ZINC SCH: 500 OINTMENT TOPICAL at 22:16

## 2018-03-01 RX ADMIN — LIDOCAINE SCH: 50 PATCH TOPICAL at 10:00

## 2018-03-02 RX ADMIN — AMITRIPTYLINE HYDROCHLORIDE SCH MG: 25 TABLET, FILM COATED ORAL at 21:22

## 2018-03-02 RX ADMIN — AMLODIPINE BESYLATE SCH MG: 10 TABLET ORAL at 09:58

## 2018-03-02 RX ADMIN — GABAPENTIN SCH MG: 100 CAPSULE ORAL at 06:17

## 2018-03-02 RX ADMIN — Medication SCH MG: at 21:22

## 2018-03-02 RX ADMIN — GABAPENTIN SCH MG: 100 CAPSULE ORAL at 21:22

## 2018-03-02 RX ADMIN — BACITRACIN ZINC SCH: 500 OINTMENT TOPICAL at 09:59

## 2018-03-02 RX ADMIN — LIDOCAINE SCH: 50 PATCH TOPICAL at 09:59

## 2018-03-02 RX ADMIN — LISINOPRIL SCH MG: 10 TABLET ORAL at 09:58

## 2018-03-02 RX ADMIN — Medication SCH TAB: at 09:58

## 2018-03-02 RX ADMIN — BACITRACIN ZINC SCH: 500 OINTMENT TOPICAL at 22:15

## 2018-03-02 RX ADMIN — GABAPENTIN SCH: 100 CAPSULE ORAL at 14:23

## 2018-03-02 RX ADMIN — Medication SCH EACH: at 21:23

## 2018-03-02 RX ADMIN — CYCLOBENZAPRINE HYDROCHLORIDE PRN MG: 10 TABLET, FILM COATED ORAL at 21:22

## 2018-03-02 RX ADMIN — NICOTINE SCH: 14 PATCH, EXTENDED RELEASE TRANSDERMAL at 09:59

## 2018-03-03 RX ADMIN — GABAPENTIN SCH MG: 100 CAPSULE ORAL at 06:19

## 2018-03-03 RX ADMIN — BACITRACIN ZINC SCH: 500 OINTMENT TOPICAL at 21:41

## 2018-03-03 RX ADMIN — AMLODIPINE BESYLATE SCH MG: 10 TABLET ORAL at 09:49

## 2018-03-03 RX ADMIN — GABAPENTIN SCH MG: 100 CAPSULE ORAL at 14:47

## 2018-03-03 RX ADMIN — LISINOPRIL SCH MG: 10 TABLET ORAL at 09:49

## 2018-03-03 RX ADMIN — LIDOCAINE SCH: 50 PATCH TOPICAL at 09:49

## 2018-03-03 RX ADMIN — BACITRACIN ZINC SCH: 500 OINTMENT TOPICAL at 09:49

## 2018-03-03 RX ADMIN — Medication SCH MG: at 21:23

## 2018-03-03 RX ADMIN — CYCLOBENZAPRINE HYDROCHLORIDE PRN MG: 10 TABLET, FILM COATED ORAL at 21:23

## 2018-03-03 RX ADMIN — Medication SCH EACH: at 21:41

## 2018-03-03 RX ADMIN — AMITRIPTYLINE HYDROCHLORIDE SCH MG: 25 TABLET, FILM COATED ORAL at 21:23

## 2018-03-03 RX ADMIN — Medication SCH TAB: at 09:49

## 2018-03-03 RX ADMIN — GABAPENTIN SCH MG: 100 CAPSULE ORAL at 21:23

## 2018-03-03 RX ADMIN — NICOTINE SCH: 14 PATCH, EXTENDED RELEASE TRANSDERMAL at 09:49

## 2018-03-04 RX ADMIN — Medication SCH TAB: at 10:03

## 2018-03-04 RX ADMIN — AMITRIPTYLINE HYDROCHLORIDE SCH MG: 25 TABLET, FILM COATED ORAL at 21:15

## 2018-03-04 RX ADMIN — BACITRACIN ZINC SCH: 500 OINTMENT TOPICAL at 10:04

## 2018-03-04 RX ADMIN — Medication SCH EACH: at 21:16

## 2018-03-04 RX ADMIN — LIDOCAINE SCH: 50 PATCH TOPICAL at 10:04

## 2018-03-04 RX ADMIN — BACITRACIN ZINC SCH: 500 OINTMENT TOPICAL at 21:53

## 2018-03-04 RX ADMIN — GABAPENTIN SCH MG: 100 CAPSULE ORAL at 06:51

## 2018-03-04 RX ADMIN — GABAPENTIN SCH: 100 CAPSULE ORAL at 14:07

## 2018-03-04 RX ADMIN — NICOTINE SCH: 14 PATCH, EXTENDED RELEASE TRANSDERMAL at 10:04

## 2018-03-04 RX ADMIN — CYCLOBENZAPRINE HYDROCHLORIDE PRN MG: 10 TABLET, FILM COATED ORAL at 21:15

## 2018-03-04 RX ADMIN — AMLODIPINE BESYLATE SCH MG: 10 TABLET ORAL at 10:03

## 2018-03-04 RX ADMIN — LISINOPRIL SCH MG: 10 TABLET ORAL at 10:03

## 2018-03-04 RX ADMIN — Medication SCH MG: at 21:15

## 2018-03-04 RX ADMIN — GABAPENTIN SCH MG: 100 CAPSULE ORAL at 21:15

## 2018-03-05 RX ADMIN — LISINOPRIL SCH MG: 10 TABLET ORAL at 10:09

## 2018-03-05 RX ADMIN — AMLODIPINE BESYLATE SCH MG: 10 TABLET ORAL at 10:09

## 2018-03-05 RX ADMIN — BACITRACIN ZINC SCH GM: 500 OINTMENT TOPICAL at 10:09

## 2018-03-05 RX ADMIN — CYCLOBENZAPRINE HYDROCHLORIDE PRN MG: 10 TABLET, FILM COATED ORAL at 21:23

## 2018-03-05 RX ADMIN — BACITRACIN ZINC SCH GM: 500 OINTMENT TOPICAL at 21:23

## 2018-03-05 RX ADMIN — AMITRIPTYLINE HYDROCHLORIDE SCH MG: 25 TABLET, FILM COATED ORAL at 21:23

## 2018-03-05 RX ADMIN — GABAPENTIN SCH MG: 100 CAPSULE ORAL at 21:23

## 2018-03-05 RX ADMIN — Medication SCH TAB: at 10:09

## 2018-03-05 RX ADMIN — Medication SCH EACH: at 21:24

## 2018-03-05 RX ADMIN — LIDOCAINE SCH: 50 PATCH TOPICAL at 10:09

## 2018-03-05 RX ADMIN — Medication SCH MG: at 21:23

## 2018-03-05 RX ADMIN — NICOTINE SCH MG: 14 PATCH, EXTENDED RELEASE TRANSDERMAL at 10:09

## 2018-03-05 RX ADMIN — GABAPENTIN SCH MG: 100 CAPSULE ORAL at 06:21

## 2018-03-05 RX ADMIN — GABAPENTIN SCH MG: 100 CAPSULE ORAL at 14:50

## 2018-03-06 RX ADMIN — AMITRIPTYLINE HYDROCHLORIDE SCH MG: 25 TABLET, FILM COATED ORAL at 21:46

## 2018-03-06 RX ADMIN — LIDOCAINE SCH: 50 PATCH TOPICAL at 09:43

## 2018-03-06 RX ADMIN — BACITRACIN ZINC SCH GM: 500 OINTMENT TOPICAL at 09:42

## 2018-03-06 RX ADMIN — LISINOPRIL SCH MG: 10 TABLET ORAL at 09:43

## 2018-03-06 RX ADMIN — BACITRACIN ZINC SCH GM: 500 OINTMENT TOPICAL at 21:46

## 2018-03-06 RX ADMIN — Medication SCH TAB: at 09:42

## 2018-03-06 RX ADMIN — Medication SCH MG: at 21:46

## 2018-03-06 RX ADMIN — NICOTINE SCH: 14 PATCH, EXTENDED RELEASE TRANSDERMAL at 09:43

## 2018-03-06 RX ADMIN — GABAPENTIN SCH: 100 CAPSULE ORAL at 14:38

## 2018-03-06 RX ADMIN — GABAPENTIN SCH MG: 100 CAPSULE ORAL at 06:09

## 2018-03-06 RX ADMIN — GABAPENTIN SCH MG: 100 CAPSULE ORAL at 21:46

## 2018-03-06 RX ADMIN — AMLODIPINE BESYLATE SCH MG: 10 TABLET ORAL at 09:42

## 2018-03-06 RX ADMIN — Medication SCH: at 21:48

## 2018-03-07 RX ADMIN — BACITRACIN ZINC SCH: 500 OINTMENT TOPICAL at 09:35

## 2018-03-07 RX ADMIN — NICOTINE SCH: 14 PATCH, EXTENDED RELEASE TRANSDERMAL at 09:35

## 2018-03-07 RX ADMIN — GABAPENTIN SCH MG: 100 CAPSULE ORAL at 21:26

## 2018-03-07 RX ADMIN — Medication SCH TAB: at 09:34

## 2018-03-07 RX ADMIN — LISINOPRIL SCH MG: 10 TABLET ORAL at 09:37

## 2018-03-07 RX ADMIN — Medication SCH MG: at 21:26

## 2018-03-07 RX ADMIN — AMITRIPTYLINE HYDROCHLORIDE SCH MG: 25 TABLET, FILM COATED ORAL at 21:26

## 2018-03-07 RX ADMIN — GABAPENTIN SCH MG: 100 CAPSULE ORAL at 14:20

## 2018-03-07 RX ADMIN — Medication SCH EACH: at 21:27

## 2018-03-07 RX ADMIN — LIDOCAINE SCH: 50 PATCH TOPICAL at 09:35

## 2018-03-07 RX ADMIN — BACITRACIN ZINC SCH GM: 500 OINTMENT TOPICAL at 21:27

## 2018-03-07 RX ADMIN — GABAPENTIN SCH: 100 CAPSULE ORAL at 06:07

## 2018-03-07 RX ADMIN — AMLODIPINE BESYLATE SCH MG: 10 TABLET ORAL at 09:35

## 2018-03-07 RX ADMIN — CYCLOBENZAPRINE HYDROCHLORIDE PRN MG: 10 TABLET, FILM COATED ORAL at 21:26

## 2018-03-08 RX ADMIN — Medication SCH MG: at 21:29

## 2018-03-08 RX ADMIN — BACITRACIN ZINC SCH: 500 OINTMENT TOPICAL at 21:30

## 2018-03-08 RX ADMIN — GABAPENTIN SCH MG: 100 CAPSULE ORAL at 06:19

## 2018-03-08 RX ADMIN — AMITRIPTYLINE HYDROCHLORIDE SCH MG: 25 TABLET, FILM COATED ORAL at 21:29

## 2018-03-08 RX ADMIN — BACITRACIN ZINC SCH: 500 OINTMENT TOPICAL at 10:03

## 2018-03-08 RX ADMIN — GABAPENTIN SCH MG: 100 CAPSULE ORAL at 14:40

## 2018-03-08 RX ADMIN — NICOTINE SCH: 14 PATCH, EXTENDED RELEASE TRANSDERMAL at 10:03

## 2018-03-08 RX ADMIN — Medication SCH: at 21:29

## 2018-03-08 RX ADMIN — Medication SCH TAB: at 10:02

## 2018-03-08 RX ADMIN — LIDOCAINE SCH: 50 PATCH TOPICAL at 10:03

## 2018-03-08 RX ADMIN — GABAPENTIN SCH MG: 100 CAPSULE ORAL at 21:29

## 2018-03-08 RX ADMIN — LISINOPRIL SCH MG: 10 TABLET ORAL at 10:03

## 2018-03-08 RX ADMIN — AMLODIPINE BESYLATE SCH: 10 TABLET ORAL at 10:03

## 2018-03-09 RX ADMIN — Medication SCH MG: at 21:29

## 2018-03-09 RX ADMIN — BACITRACIN ZINC SCH GM: 500 OINTMENT TOPICAL at 21:29

## 2018-03-09 RX ADMIN — LISINOPRIL SCH MG: 10 TABLET ORAL at 10:05

## 2018-03-09 RX ADMIN — AMLODIPINE BESYLATE SCH MG: 10 TABLET ORAL at 10:04

## 2018-03-09 RX ADMIN — BACITRACIN ZINC SCH: 500 OINTMENT TOPICAL at 10:04

## 2018-03-09 RX ADMIN — LIDOCAINE SCH: 50 PATCH TOPICAL at 10:04

## 2018-03-09 RX ADMIN — GABAPENTIN SCH MG: 100 CAPSULE ORAL at 21:29

## 2018-03-09 RX ADMIN — NICOTINE SCH: 14 PATCH, EXTENDED RELEASE TRANSDERMAL at 10:04

## 2018-03-09 RX ADMIN — GABAPENTIN SCH MG: 100 CAPSULE ORAL at 06:43

## 2018-03-09 RX ADMIN — AMITRIPTYLINE HYDROCHLORIDE SCH MG: 25 TABLET, FILM COATED ORAL at 21:29

## 2018-03-09 RX ADMIN — CYCLOBENZAPRINE HYDROCHLORIDE PRN MG: 10 TABLET, FILM COATED ORAL at 21:29

## 2018-03-09 RX ADMIN — Medication SCH TAB: at 10:05

## 2018-03-09 RX ADMIN — Medication SCH EACH: at 21:30

## 2018-03-09 RX ADMIN — GABAPENTIN SCH MG: 100 CAPSULE ORAL at 14:24

## 2018-03-10 RX ADMIN — BACITRACIN ZINC SCH: 500 OINTMENT TOPICAL at 09:26

## 2018-03-10 RX ADMIN — GABAPENTIN SCH MG: 100 CAPSULE ORAL at 14:03

## 2018-03-10 RX ADMIN — CYCLOBENZAPRINE HYDROCHLORIDE PRN MG: 10 TABLET, FILM COATED ORAL at 21:20

## 2018-03-10 RX ADMIN — LIDOCAINE SCH: 50 PATCH TOPICAL at 09:26

## 2018-03-10 RX ADMIN — AMLODIPINE BESYLATE SCH MG: 10 TABLET ORAL at 09:25

## 2018-03-10 RX ADMIN — BACITRACIN ZINC SCH GM: 500 OINTMENT TOPICAL at 21:20

## 2018-03-10 RX ADMIN — NICOTINE SCH: 14 PATCH, EXTENDED RELEASE TRANSDERMAL at 09:26

## 2018-03-10 RX ADMIN — GABAPENTIN SCH MG: 100 CAPSULE ORAL at 06:25

## 2018-03-10 RX ADMIN — Medication SCH TAB: at 09:25

## 2018-03-10 RX ADMIN — LISINOPRIL SCH MG: 10 TABLET ORAL at 09:25

## 2018-03-10 RX ADMIN — GABAPENTIN SCH MG: 100 CAPSULE ORAL at 21:20

## 2018-03-10 RX ADMIN — Medication SCH EACH: at 21:21

## 2018-03-10 RX ADMIN — AMITRIPTYLINE HYDROCHLORIDE SCH MG: 25 TABLET, FILM COATED ORAL at 21:20

## 2018-03-10 RX ADMIN — Medication SCH MG: at 21:21

## 2018-03-11 RX ADMIN — NICOTINE SCH: 14 PATCH, EXTENDED RELEASE TRANSDERMAL at 09:24

## 2018-03-11 RX ADMIN — Medication SCH TAB: at 09:24

## 2018-03-11 RX ADMIN — Medication SCH: at 21:29

## 2018-03-11 RX ADMIN — AMLODIPINE BESYLATE SCH MG: 10 TABLET ORAL at 09:24

## 2018-03-11 RX ADMIN — CYCLOBENZAPRINE HYDROCHLORIDE PRN MG: 10 TABLET, FILM COATED ORAL at 21:28

## 2018-03-11 RX ADMIN — BACITRACIN ZINC SCH GM: 500 OINTMENT TOPICAL at 21:27

## 2018-03-11 RX ADMIN — BACITRACIN ZINC SCH: 500 OINTMENT TOPICAL at 09:25

## 2018-03-11 RX ADMIN — Medication SCH MG: at 21:28

## 2018-03-11 RX ADMIN — GABAPENTIN SCH MG: 100 CAPSULE ORAL at 21:28

## 2018-03-11 RX ADMIN — GABAPENTIN SCH MG: 100 CAPSULE ORAL at 14:08

## 2018-03-11 RX ADMIN — AMITRIPTYLINE HYDROCHLORIDE SCH MG: 25 TABLET, FILM COATED ORAL at 21:27

## 2018-03-11 RX ADMIN — GABAPENTIN SCH MG: 100 CAPSULE ORAL at 06:40

## 2018-03-11 RX ADMIN — LIDOCAINE SCH: 50 PATCH TOPICAL at 09:24

## 2018-03-11 RX ADMIN — LISINOPRIL SCH MG: 10 TABLET ORAL at 09:24

## 2018-03-12 VITALS — TEMPERATURE: 97.3 F | DIASTOLIC BLOOD PRESSURE: 92 MMHG | HEART RATE: 94 BPM | SYSTOLIC BLOOD PRESSURE: 137 MMHG

## 2018-03-12 RX ADMIN — GABAPENTIN SCH MG: 100 CAPSULE ORAL at 06:10

## 2018-03-12 NOTE — PN
Psychiatric Progress Note


Vital Signs: 


 Vital Signs











 Period  Temp  Pulse  Resp  BP Sys/Bach  Pulse Ox


 


 Last 24 Hr  97.3 F  87-94  16-20  135-170/  











Date of Session: 03/12/18


Chief Complaint:: Discharge Note


HPI: Patient addressing Alcohol, Cocaine and Cannabis Dependence comorbid with 

Nicotine Dependence


ROS: HTN, HLD, Chronoc back pain


Current Medications: 


Active Medications











Generic Name Dose Route Start Last Admin





  Trade Name Freq  PRN Reason Stop Dose Admin


 


Acetaminophen  650 mg  02/19/18 10:48  





  Tylenol -  PO   





  Q4H PRN   





  FEVER   


 


Al Hydroxide/Mg Hydroxide  30 ml  02/19/18 10:48  





  Mylanta Oral Suspension -  PO   





  Q6H PRN   





  DYSPEPSIA   


 


Amitriptyline HCl  50 mg  02/26/18 22:00  03/11/18 21:27





  Elavil -  PO   50 mg





  HS EDWIGE   Administration


 


Amlodipine Besylate  10 mg  02/21/18 16:30  03/11/18 09:24





  Norvasc -  PO   10 mg





  DAILY EDWIGE   Administration


 


Bacitracin  0.9 gm  02/23/18 16:47  03/11/18 21:27





  Bacitracin -  TP   0.9 gm





  BID EDWIGE   Administration


 


Cyclobenzaprine HCl  10 mg  02/21/18 15:44  03/11/18 21:28





  Flexeril -  PO   10 mg





  TID PRN   Administration





  MUSCLE SPASMS   


 


Eucalyptus/Menthol/Phenol/Sorbitol  1 each  02/19/18 10:48  





  Cepastat Lozenge -  MM   





  Q4H PRN   





  SORE THROAT   


 


Gabapentin  100 mg  02/25/18 14:00  03/12/18 06:10





  Neurontin -  PO   100 mg





  TID EDWIGE   Administration


 


Guaifenesin  10 ml  02/19/18 10:48  





  Robitussin Dm -  PO   





  Q6H PRN   





  COUGH   


 


Hydroxyzine Pamoate  50 mg  02/19/18 10:48  





  Vistaril -  PO   





  Q4H PRN   





  AGITATION   


 


Lidocaine  1 patch  02/19/18 16:30  03/11/18 09:24





  Lidoderm Patch -  TP   Not Given





  DAILY EDWIGE   


 


Lisinopril  10 mg  02/28/18 10:00  03/11/18 09:24





  Prinivil  PO   10 mg





  DAILY EDWIGE   Administration


 


Loperamide HCl  4 mg  02/19/18 10:48  





  Imodium -  PO   





  Q6H PRN   





  DIARRHEA   


 


Magnesium Citrate  300 ml  02/19/18 10:48  





  Citroma -  PO   





  Q48H PRN   





  CONSTIPATION   


 


Magnesium Hydroxide  30 ml  02/19/18 10:48  





  Milk Of Magnesia -  PO   





  DAILY PRN   





  CONSTIPATION   


 


Miscellaneous  1 each  02/19/18 22:00  03/11/18 21:29





  Lidoderm Patch Removal  MC   Not Given





  DAILY@2200 EDWIGE   


 


Nicotine  14 mg  02/27/18 14:00  03/11/18 09:24





  Nicoderm Patch -  TD   Not Given





  DAILY EDWIGE   


 


Nicotine Polacrilex  2 mg  02/27/18 12:40  





  Nicorette Gum -  BC   





  Q2H PRN   





  NICOTINE REPLACEMENT RX   


 


Prenatal Multivit/Folic Acid/Iron  1 tab  02/20/18 10:00  03/11/18 09:24





  Prenatal Vitamins (Sjr) -  PO   1 tab





  DAILY EDWIGE   Administration


 


Pseudoephedrine/Triprolidine  1 combo  02/19/18 10:48  





  Actifed -  PO   





  TID PRN   





  NASAL CONGESTION   


 


Thiamine HCl  100 mg  02/19/18 22:00  03/11/18 21:28





  Vitamin B1 -  PO   100 mg





  HS EDWIGE   Administration











Current Side Effect: No


Lab tests ordered: Yes


Lab tests reviewed: Yes


Provider note:: Marimar has completed this program today. He has met his 

treatment goals and will continue to address his issues in outpatient treatment 

at [facility]. Told writer that from his participation in this program

, he has learned the importance of making meetings and have a sponsor. He is 

stable for discharge today


Total face to face time:: 35





Mental Status Exam





- Mental Status Exam


Alert and Oriented to: Time, Place, Person


Cognitive Function: Fair


Patient Appearance: Well Groomed


Mood: Hopeful, Euthymic


Affect: Appropriate


Patient Behavior: Cooperative


Speech Pattern: Clear


Voice Loudness: Normal


Thought Process: Intact, Goal Oriented


Thought Disorder: Not Present


Hallucinations: Denies


Suicidal Ideation: Denies


Homicidal Ideation: Denies


Insight/Judgement: Fair


Sleep: Fair


Appetite: Good


Muscle strength/Tone: Normal


Gait/Station: Normal





Psychiatric Treatment Plan





- Problem List


(1) Alcohol dependence


Current Visit: Yes   





(2) Cocaine dependence


Current Visit: Yes   





(3) Cannabis dependence


Current Visit: Yes   





(4) Nicotine dependence


Current Visit: No   


Qualifiers: 


 





(5) Hypertension


Current Visit: No   


Qualifiers: 


 





(6) Hyperlipidemia


Current Visit: Yes   





(7) Chronic low back pain


Current Visit: No   


Initial treatment plan: Patient is discharged today and referred to [facility] for outpatient treatment

## 2018-04-14 ENCOUNTER — HOSPITAL ENCOUNTER (INPATIENT)
Dept: HOSPITAL 74 - YASAS | Age: 63
LOS: 3 days | Discharge: HOME | End: 2018-04-17
Attending: INTERNAL MEDICINE | Admitting: INTERNAL MEDICINE
Payer: COMMERCIAL

## 2018-04-14 VITALS — BODY MASS INDEX: 23.7 KG/M2

## 2018-04-14 DIAGNOSIS — I12.9: ICD-10-CM

## 2018-04-14 DIAGNOSIS — F12.20: ICD-10-CM

## 2018-04-14 DIAGNOSIS — M54.5: ICD-10-CM

## 2018-04-14 DIAGNOSIS — F17.210: ICD-10-CM

## 2018-04-14 DIAGNOSIS — F14.20: ICD-10-CM

## 2018-04-14 DIAGNOSIS — E78.00: ICD-10-CM

## 2018-04-14 DIAGNOSIS — G89.29: ICD-10-CM

## 2018-04-14 DIAGNOSIS — M79.602: ICD-10-CM

## 2018-04-14 DIAGNOSIS — F10.230: Primary | ICD-10-CM

## 2018-04-14 DIAGNOSIS — E78.5: ICD-10-CM

## 2018-04-14 LAB
APPEARANCE UR: CLEAR
BACTERIA #/AREA URNS HPF: (no result) /HPF
BILIRUB UR STRIP.AUTO-MCNC: NEGATIVE MG/DL
COLOR UR: YELLOW
EPITH CASTS URNS QL MICRO: (no result) /HPF
HYALINE CASTS URNS QL MICRO: 18 /LPF
KETONES UR QL STRIP: NEGATIVE
LEUKOCYTE ESTERASE UR QL STRIP.AUTO: (no result)
MUCOUS THREADS URNS QL MICRO: (no result)
NITRITE UR QL STRIP: NEGATIVE
PH UR: 5 [PH] (ref 5–8)
PROT UR QL STRIP: NEGATIVE
PROT UR QL STRIP: NEGATIVE
RBC # UR STRIP: NEGATIVE /UL
SP GR UR: 1.03 (ref 1–1.03)
UROBILINOGEN UR STRIP-MCNC: 2 MG/DL (ref 0.2–1)

## 2018-04-14 PROCEDURE — HZ2ZZZZ DETOXIFICATION SERVICES FOR SUBSTANCE ABUSE TREATMENT: ICD-10-PCS | Performed by: INTERNAL MEDICINE

## 2018-04-14 RX ADMIN — ANALGESIC BALM SCH: 1.74; 4.06 OINTMENT TOPICAL at 22:32

## 2018-04-14 RX ADMIN — Medication SCH MG: at 22:31

## 2018-04-14 NOTE — HP
CIWA Score





- CIWA Score


Nausea/Vomitin-Mild Nausea/No Vomiting


Muscle Tremors: 2


Anxiety: 4-Mod. Anxious/Guarded


Agitation: 0-Normal Activity


Paroxysmal Sweats: 1-Minimal Palms Moist


Orientation: 0-Oriented


Tacttile Disturbances: 1-Very Mild Itch/Numbness


Auditory Disturbances: 1-Very Mild


Visual Disturbances: 1-Very Mild Sensitivity


Headache: 3-Moderate


CIWA-Ar Total Score: 14





Admission ROS BHS





- HPI


Chief Complaint: 


I need to get clean for my pain doctor


Allergies/Adverse Reactions: 


 Allergies











Allergy/AdvReac Type Severity Reaction Status Date / Time


 


No Known Allergies Allergy   Verified 18 10:46











History of Present Illness: 


61 yo gentleman here for detox from alcohol and cocaine, this is one of several 

admissions for detox.  Denies seizures or black outs. [place] noted to [place] 

oxycodone 30mg, urine tox negative for oxycodone - states the police took it 

from him a week ago. He is aware he cannot get this while inpatient.  States he 

was in ED [facility] yesterday as needed BP meds.


Exam Limitations: Clinical Condition





- Ebola screening


Have you traveled outside of the country in the last 21 days: No (N)


Have you had contact with anyone from an Ebola affected area: No


Have you been sick,other than usual withdrawal symptoms: No


Do you have a fever: No





- Review of Systems


Constitutional: Loss of Appetite, Malaise, Changes in sleep, Weakness


EENT: reports: No Symptoms Reported


Respiratory: reports: No Symptoms reported


Cardiac: reports: No Symptoms Reported


GI: reports: Nausea, Poor Appetite, Poor Fluid Intake


: reports: No Symptoms Reported


Musculoskeletal: reports: Back Pain


Integumentary: reports: No Symptoms Reported


Neuro: reports: Headache


Endocrine: reports: No Symptoms Reported


Hematology: reports: No Symptoms Reported


Psychiatric: reports: Judgement Intact, Mood/Affect Appropiate, Orientated x3, 

Anxious


Other Systems: Reviewed and Negative





Patient History





- Patient Medical History


Hx Anemia: No


Hx Asthma: No


Hx Chronic Obstructive Pulmonary Disease (COPD): No


Hx Cancer: No


Hx Cardiac Disorders: No


Hx Congestive Heart Failure: No


Hx Hypertension: Yes


Hx Hypercholesterolemia: Yes (In past, not taking any current medications.)


Hx Pacemaker: No


HX Cerebrovascular Accident: No


Hx Seizures: No


Hx Dementia: No


Hx Diabetes: No


Hx Gastrointestinal Disorders: No


Hx Liver Disease: No


Hx Genitourinary Disorders: No


Hx Sexually Transmitted Disorders: No


Hx Renal Disease (ESRD): Yes (renal insufficiency stage III)


Hx Thyroid Disease: No


Hx Human Immunodeficiency Virus (HIV): No


Hx Hepatitis C: No


Hx Depression: No


Hx Suicide Attempt: No


Hx Bipolar Disorder: No


Hx Schizophrenia: No


Other Medical History: back pain





- Patient Surgical History


Past Surgical History: No


Hx Neurologic Surgery: No


Hx Cataract Extraction: No


Hx Cardiac Surgery: No


Hx Lung Surgery: No


Hx Breast Surgery: No


Hx Breast Biopsy: No


Hx Abdominal Surgery: No


Hx Appendectomy: No


Hx Cholecystectomy: No


Hx Genitourinary Surgery: No


Hx  Section: No


Hx Orthopedic Surgery: No


Anesthesia Reaction: No





- PPD History


Previous Implant?: Yes


Documented Results: Positive w/proof


Implanted On Prior [facility] Admission?: Yes


Date: 17


Results: 0MM


PPD to be Administered?: No





- Reproductive History


Patient is a Female of Child Bearing Age (11 -55 yrs old): No (male)





- Smoking Cessation


Smoking history: Current every day smoker


Have you smoked in the past 12 months: Yes


Aproximately how many cigarettes per day: 20


Cigars Per Day: 0


Hx Chewing Tobacco Use: No


Initiated information on smoking cessation: Yes


'Breaking Loose' booklet given: 18





- Substance & Tx. History


Hx Alcohol Use: Yes


Hx Substance Use: Yes


Substance Use Type: Alcohol, Cocaine, Marijuana


Hx Substance Use Treatment: Yes (detox, rehab)





- Substances Abused


  ** Cocaine


Route: Inhalation


Frequency: Daily


Amount used: 2 GRM


Age of first use: 19


Date of Last Use: 18





  ** ETOH


Route: Oral


Frequency: Daily


Amount used: 1 pINT GIN


Age of first use: 42


Date of Last Use: 18





  ** MARIJUANA


Route: Smoking


Frequency: Daily


Amount used: 1 GRAM


Age of first use: 17


Date of Last Use: 18





Family Disease History





- Family Disease History


Family Disease History: Heart Disease: Mother (CVA; .), Other: Father (

, no contact), Mother, Brother (one - living -healthy), Sister (one - 

living - healthy), Son (2 - living, healthy), Daughter (1 - living - healthy)





Admission Physical Exam B[facility]





- Vital Signs


Vital Signs: 


 Vital Signs - 24 hr











  18





  10:00


 


Temperature 97.5 F L


 


Pulse Rate 79


 


Respiratory 18





Rate 


 


Blood Pressure 140/84














- Physical


General Appearance: Yes: Nourished, Appropriately Dressed, Mild Distress, 

Irritable, Anxious


HEENTM: Yes: EOMI, Hearing grossly Normal, Normocephalic, Normal Voice, Pharynx 

Normal


Respiratory: Yes: Normal Breath Sounds, No Respiratory Distress


Neck: Yes: No masses,lesions,Nodules, Supple


Breast: Yes: Breast Exam Deferred


Cardiology: Yes: Regular Rhythm, Regular Rate


Abdominal: Yes: Non Tender, Flat


Genitourinary: Yes: Frequency


Back: Yes: Normal Inspection


Musculoskeletal: Yes: Gait Steady, Back pain


Extremities: Yes: Normal Inspection, Normal Range of Motion, Non-Tender


Neurological: Yes: Fully Oriented, Alert, Motor Strength 5/5, Normal Mood/Affect

, Normal Response


Integumentary: Yes: Normal Color, Dry, Warm


Lymphatic: Yes: Within Normal Limits





- Diagnostic


(1) Alcohol dependence with uncomplicated withdrawal


Current Visit: Yes   Status: Chronic   





(2) Cocaine dependence


Current Visit: Yes   Status: Chronic   


Qualifiers: 


   Substance use status: uncomplicated   Qualified Code(s): F14.20 - Cocaine 

dependence, uncomplicated   





(3) Chronic low back pain


Current Visit: Yes   Status: Chronic   


Qualifiers: 


   Back pain laterality: midline   Sciatica presence: without sciatica   

Qualified Code(s): M54.5 - Low back pain; G89.29 - Other chronic pain; G89.29 - 

Other chronic pain   





(4) Chronic renal insufficiency, stage III (moderate)


Current Visit: Yes   Status: Chronic   





(5) Hypertension


Current Visit: Yes   Status: Chronic   


Qualifiers: 


   Hypertension type: essential hypertension   Qualified Code(s): I10 - 

Essential (primary) hypertension   





(6) Nicotine dependence


Current Visit: Yes   Status: Chronic   


Qualifiers: 


   Nicotine product type: cigarettes   Substance use status: uncomplicated   

Qualified Code(s): F17.210 - Nicotine dependence, cigarettes, uncomplicated   





Cleared for Admission B[facility]





- Detox or Rehab


[facility] Level of Care: Medically Managed


Detox Regimen/Protocol: Librium





BHS Breath Alcohol Content


Breath Alcohol Content: 0





Urine Drug Screen





- Results


Drug Screen Negative: No


Urine Drug Screen Results: THC-Marijuana, KAYLYNN-Cocaine

## 2018-04-15 LAB
ALBUMIN SERPL-MCNC: 3.1 G/DL (ref 3.4–5)
ALP SERPL-CCNC: 129 U/L (ref 45–117)
ALT SERPL-CCNC: 33 U/L (ref 12–78)
ANION GAP SERPL CALC-SCNC: 2 MMOL/L (ref 8–16)
AST SERPL-CCNC: 27 U/L (ref 15–37)
BILIRUB SERPL-MCNC: < 0.1 MG/DL (ref 0.2–1)
BUN SERPL-MCNC: 25 MG/DL (ref 7–18)
CALCIUM SERPL-MCNC: 8.2 MG/DL (ref 8.5–10.1)
CHLORIDE SERPL-SCNC: 108 MMOL/L (ref 98–107)
CO2 SERPL-SCNC: 33 MMOL/L (ref 21–32)
CREAT SERPL-MCNC: 1.6 MG/DL (ref 0.7–1.3)
DEPRECATED RDW RBC AUTO: 14.2 % (ref 11.9–15.9)
GLUCOSE SERPL-MCNC: 94 MG/DL (ref 74–106)
HCT VFR BLD CALC: 38.2 % (ref 35.4–49)
HGB BLD-MCNC: 12.5 GM/DL (ref 11.7–16.9)
MCH RBC QN AUTO: 28 PG (ref 25.7–33.7)
MCHC RBC AUTO-ENTMCNC: 32.7 G/DL (ref 32–35.9)
MCV RBC: 85.8 FL (ref 80–96)
PLATELET # BLD AUTO: 229 K/MM3 (ref 134–434)
PMV BLD: 9.5 FL (ref 7.5–11.1)
POTASSIUM SERPLBLD-SCNC: 3.8 MMOL/L (ref 3.5–5.1)
PROT SERPL-MCNC: 6.8 G/DL (ref 6.4–8.2)
RBC # BLD AUTO: 4.46 M/MM3 (ref 4–5.6)
SODIUM SERPL-SCNC: 143 MMOL/L (ref 136–145)
WBC # BLD AUTO: 4.5 K/MM3 (ref 4–10)

## 2018-04-15 RX ADMIN — HYDROCHLOROTHIAZIDE SCH MG: 25 TABLET ORAL at 10:34

## 2018-04-15 RX ADMIN — Medication SCH: at 23:07

## 2018-04-15 RX ADMIN — LISINOPRIL SCH MG: 10 TABLET ORAL at 10:34

## 2018-04-15 RX ADMIN — Medication SCH TAB: at 10:34

## 2018-04-15 RX ADMIN — AMLODIPINE BESYLATE SCH MG: 10 TABLET ORAL at 10:34

## 2018-04-15 RX ADMIN — ANALGESIC BALM SCH APPLIC: 1.74; 4.06 OINTMENT TOPICAL at 10:31

## 2018-04-15 RX ADMIN — ANALGESIC BALM SCH: 1.74; 4.06 OINTMENT TOPICAL at 23:06

## 2018-04-15 NOTE — PN
[facility] CIWA





- CIWA Score


Nausea/Vomitin-No Nausea/No Vomiting


Muscle Tremors: 4-Moderate,w/Arms Extend


Anxiety: 3


Agitation: 4-Moderately Restless


Paroxysmal Sweats: 3


Orientation: 0-Oriented


Tacttile Disturbances: 0-None


Auditory Disturbances: 0-None


Visual Disturbances: 0-None


Headache: 0-None Present


CIWA-Ar Total Score: 14





BHS Progress Note (SOAP)


Subjective: 





sweats


shakes


interrupted sleep


body aches





Objective: 





04/15/18 11:59


 Vital Signs











Temperature  95.9 F L  04/15/18 11:21


 


Pulse Rate  85   04/15/18 11:21


 


Respiratory Rate  20   04/15/18 11:21


 


Blood Pressure  157/95   04/15/18 11:21


 


O2 Sat by Pulse Oximetry (%)      








 Laboratory Tests











  04/14/18 04/15/18 04/15/18





  14:30 07:30 07:30


 


WBC   4.5 


 


RBC   4.46 


 


Hgb   12.5  D 


 


Hct   38.2 


 


MCV   85.8 


 


MCH   28.0 


 


MCHC   32.7 


 


RDW   14.2 


 


Plt Count   229  D 


 


MPV   9.5 


 


Sodium    143


 


Potassium    3.8


 


Chloride    108 H


 


Carbon Dioxide    33 H


 


Anion Gap    2 L


 


BUN    25 H


 


Creatinine    1.6 H


 


Creat Clearance w eGFR    44.31


 


Random Glucose    94


 


Calcium    8.2 L


 


Total Bilirubin    < 0.1 L D


 


AST    27  D


 


ALT    33


 


Alkaline Phosphatase    129 H


 


Total Protein    6.8


 


Albumin    3.1 L


 


Urine Color  Yellow  


 


Urine Appearance  Clear  


 


Urine pH  5.0  


 


Ur Specific Gravity  1.026  


 


Urine Protein  Negative  


 


Urine Glucose (UA)  Negative  


 


Urine Ketones  Negative  


 


Urine Blood  Negative  


 


Urine Nitrite  Negative  


 


Urine Bilirubin  Negative  


 


Urine Urobilinogen  2.0  


 


Ur Leukocyte Esterase  Trace  


 


Urine WBC (Auto)  10  


 


Urine RBC (Auto)  1  


 


Ur Epithelial Cells  Rare  


 


Urine Bacteria  Rare  


 


Hyaline Casts  18  


 


Urine Mucus  Rare  


 


RPR Titer   














  04/15/18





  07:30


 


WBC 


 


RBC 


 


Hgb 


 


Hct 


 


MCV 


 


MCH 


 


MCHC 


 


RDW 


 


Plt Count 


 


MPV 


 


Sodium 


 


Potassium 


 


Chloride 


 


Carbon Dioxide 


 


Anion Gap 


 


BUN 


 


Creatinine 


 


Creat Clearance w eGFR 


 


Random Glucose 


 


Calcium 


 


Total Bilirubin 


 


AST 


 


ALT 


 


Alkaline Phosphatase 


 


Total Protein 


 


Albumin 


 


Urine Color 


 


Urine Appearance 


 


Urine pH 


 


Ur Specific Gravity 


 


Urine Protein 


 


Urine Glucose (UA) 


 


Urine Ketones 


 


Urine Blood 


 


Urine Nitrite 


 


Urine Bilirubin 


 


Urine Urobilinogen 


 


Ur Leukocyte Esterase 


 


Urine WBC (Auto) 


 


Urine RBC (Auto) 


 


Ur Epithelial Cells 


 


Urine Bacteria 


 


Hyaline Casts 


 


Urine Mucus 


 


RPR Titer  Nonreactive








aaox3


ambulating


no acute distress


Assessment: 





04/15/18 11:59


withdrawal sx


Plan: 





continue detox


increase fluids

## 2018-04-16 RX ADMIN — HYDROCHLOROTHIAZIDE SCH MG: 25 TABLET ORAL at 10:40

## 2018-04-16 RX ADMIN — ANALGESIC BALM SCH: 1.74; 4.06 OINTMENT TOPICAL at 10:40

## 2018-04-16 RX ADMIN — Medication SCH TAB: at 10:39

## 2018-04-16 RX ADMIN — LISINOPRIL SCH MG: 10 TABLET ORAL at 10:40

## 2018-04-16 RX ADMIN — AMLODIPINE BESYLATE SCH MG: 10 TABLET ORAL at 11:54

## 2018-04-16 RX ADMIN — ANALGESIC BALM SCH: 1.74; 4.06 OINTMENT TOPICAL at 22:26

## 2018-04-16 RX ADMIN — Medication SCH: at 22:26

## 2018-04-16 NOTE — PN
[facility] CIWA





- CIWA Score


Nausea/Vomitin-No Nausea/No Vomiting


Muscle Tremors: 3


Anxiety: 4-Mod. Anxious/Guarded


Agitation: 2


Paroxysmal Sweats: 3


Orientation: 0-Oriented


Tacttile Disturbances: 2-Mild Itch/Numbness/Burn


Auditory Disturbances: 0-None


Visual Disturbances: 0-None


Headache: 0-None Present


CIWA-Ar Total Score: 14





BHS Progress Note (SOAP)


Subjective: 





Fatigue, Tremors, Sweating.


Objective: 


PATIENT A & O X 3, OBSERVED AMBULATING ON UNIT. NO ACUTE DISTRESS.





18 14:29


 Vital Signs











Temperature  96.7 F L  18 14:09


 


Pulse Rate  72   18 14:09


 


Respiratory Rate  18   18 14:09


 


Blood Pressure  158/90   18 14:09


 


O2 Sat by Pulse Oximetry (%)      








 Laboratory Tests











  04/14/18 04/15/18 04/15/18





  14:30 07:30 07:30


 


WBC   4.5 


 


RBC   4.46 


 


Hgb   12.5  D 


 


Hct   38.2 


 


MCV   85.8 


 


MCH   28.0 


 


MCHC   32.7 


 


RDW   14.2 


 


Plt Count   229  D 


 


MPV   9.5 


 


Sodium    143


 


Potassium    3.8


 


Chloride    108 H


 


Carbon Dioxide    33 H


 


Anion Gap    2 L


 


BUN    25 H


 


Creatinine    1.6 H


 


Creat Clearance w eGFR    44.31


 


Random Glucose    94


 


Calcium    8.2 L


 


Total Bilirubin    < 0.1 L D


 


AST    27  D


 


ALT    33


 


Alkaline Phosphatase    129 H


 


Total Protein    6.8


 


Albumin    3.1 L


 


Urine Color  Yellow  


 


Urine Appearance  Clear  


 


Urine pH  5.0  


 


Ur Specific Gravity  1.026  


 


Urine Protein  Negative  


 


Urine Glucose (UA)  Negative  


 


Urine Ketones  Negative  


 


Urine Blood  Negative  


 


Urine Nitrite  Negative  


 


Urine Bilirubin  Negative  


 


Urine Urobilinogen  2.0  


 


Ur Leukocyte Esterase  Trace  


 


Urine WBC (Auto)  10  


 


Urine RBC (Auto)  1  


 


Ur Epithelial Cells  Rare  


 


Urine Bacteria  Rare  


 


Hyaline Casts  18  


 


Urine Mucus  Rare  


 


RPR Titer   














  04/15/18





  07:30


 


WBC 


 


RBC 


 


Hgb 


 


Hct 


 


MCV 


 


MCH 


 


MCHC 


 


RDW 


 


Plt Count 


 


MPV 


 


Sodium 


 


Potassium 


 


Chloride 


 


Carbon Dioxide 


 


Anion Gap 


 


BUN 


 


Creatinine 


 


Creat Clearance w eGFR 


 


Random Glucose 


 


Calcium 


 


Total Bilirubin 


 


AST 


 


ALT 


 


Alkaline Phosphatase 


 


Total Protein 


 


Albumin 


 


Urine Color 


 


Urine Appearance 


 


Urine pH 


 


Ur Specific Gravity 


 


Urine Protein 


 


Urine Glucose (UA) 


 


Urine Ketones 


 


Urine Blood 


 


Urine Nitrite 


 


Urine Bilirubin 


 


Urine Urobilinogen 


 


Ur Leukocyte Esterase 


 


Urine WBC (Auto) 


 


Urine RBC (Auto) 


 


Ur Epithelial Cells 


 


Urine Bacteria 


 


Hyaline Casts 


 


Urine Mucus 


 


RPR Titer  Nonreactive








LABS NOTED.


RESULTS OF ADMISSION ECG NOTED.


18 14:33





Assessment: 





18 14:29


WITHDRAWAL SYMPTOMS.


HYPERTENSION.





18 14:33





Plan: 





CONTINUE DETOX.


INCREASE DAILY PO FLUID INTAKE.


INCREASE LISINOPRIL TO 20 MG PO DAILY FOR ELEVATED BP.


PATIENT REPORTS THAT HE HAS PREVIOUSLY RECEIVED MEDICAL CONSULTATION FOR ECG / 

CARDIAC ABNORMALITIES.


D/C IBUPROFEN AND MAGNESIUM-CONTAINING MEDS FOR ABNORMAL ADMISSION RENAL LAB 

VALUES.

## 2018-04-16 NOTE — EKG
Test Reason : 

Blood Pressure : ***/*** mmHG

Vent. Rate : 082 BPM     Atrial Rate : 082 BPM

   P-R Int : 148 ms          QRS Dur : 094 ms

    QT Int : 420 ms       P-R-T Axes : 069 -28 -59 degrees

   QTc Int : 490 ms

 

NORMAL SINUS RHYTHM

VOLTAGE CRITERIA FOR LEFT VENTRICULAR HYPERTROPHY

CANNOT RULE OUT SEPTAL INFARCT (CITED ON OR BEFORE 06-NOV-2017)

T WAVE ABNORMALITY, CONSIDER INFEROLATERAL ISCHEMIA

ABNORMAL ECG

WHEN COMPARED WITH ECG OF 19-FEB-2018 22:08,

COMPARED TO EKG

NO SIGNIFICANT CHANGE IS FOUND

Confirmed by AXEL ACUNA MD ([id number]) on 4/16/2018 12:41:42 PM

 

Referred By:             Confirmed By:AXEL ACUNA MD

## 2018-04-17 VITALS — HEART RATE: 102 BPM | DIASTOLIC BLOOD PRESSURE: 96 MMHG | SYSTOLIC BLOOD PRESSURE: 138 MMHG | TEMPERATURE: 97.1 F

## 2018-04-17 NOTE — PN
[facility] Progress Note (SOAP)


Subjective: 





Patient denies current Detox symptoms and reports that he feels well overall.


Objective: 


PATIENT A & O X 3, OBSERVED AMBULATING ON UNIT. NO ACUTE DISTRESS.





04/17/18 16:06


 Vital Signs











Temperature  97.1 F L  04/17/18 09:11


 


Pulse Rate  102 H  04/17/18 09:11


 


Respiratory Rate  16   04/17/18 09:11


 


Blood Pressure  138/96   04/17/18 09:11


 


O2 Sat by Pulse Oximetry (%)      








 Laboratory Tests











  04/14/18 04/15/18 04/15/18





  14:30 07:30 07:30


 


WBC   4.5 


 


RBC   4.46 


 


Hgb   12.5  D 


 


Hct   38.2 


 


MCV   85.8 


 


MCH   28.0 


 


MCHC   32.7 


 


RDW   14.2 


 


Plt Count   229  D 


 


MPV   9.5 


 


Sodium    143


 


Potassium    3.8


 


Chloride    108 H


 


Carbon Dioxide    33 H


 


Anion Gap    2 L


 


BUN    25 H


 


Creatinine    1.6 H


 


Creat Clearance w eGFR    44.31


 


Random Glucose    94


 


Calcium    8.2 L


 


Total Bilirubin    < 0.1 L D


 


AST    27  D


 


ALT    33


 


Alkaline Phosphatase    129 H


 


Total Protein    6.8


 


Albumin    3.1 L


 


Urine Color  Yellow  


 


Urine Appearance  Clear  


 


Urine pH  5.0  


 


Ur Specific Gravity  1.026  


 


Urine Protein  Negative  


 


Urine Glucose (UA)  Negative  


 


Urine Ketones  Negative  


 


Urine Blood  Negative  


 


Urine Nitrite  Negative  


 


Urine Bilirubin  Negative  


 


Urine Urobilinogen  2.0  


 


Ur Leukocyte Esterase  Trace  


 


Urine WBC (Auto)  10  


 


Urine RBC (Auto)  1  


 


Ur Epithelial Cells  Rare  


 


Urine Bacteria  Rare  


 


Hyaline Casts  18  


 


Urine Mucus  Rare  


 


RPR Titer   














  04/15/18





  07:30


 


WBC 


 


RBC 


 


Hgb 


 


Hct 


 


MCV 


 


MCH 


 


MCHC 


 


RDW 


 


Plt Count 


 


MPV 


 


Sodium 


 


Potassium 


 


Chloride 


 


Carbon Dioxide 


 


Anion Gap 


 


BUN 


 


Creatinine 


 


Creat Clearance w eGFR 


 


Random Glucose 


 


Calcium 


 


Total Bilirubin 


 


AST 


 


ALT 


 


Alkaline Phosphatase 


 


Total Protein 


 


Albumin 


 


Urine Color 


 


Urine Appearance 


 


Urine pH 


 


Ur Specific Gravity 


 


Urine Protein 


 


Urine Glucose (UA) 


 


Urine Ketones 


 


Urine Blood 


 


Urine Nitrite 


 


Urine Bilirubin 


 


Urine Urobilinogen 


 


Ur Leukocyte Esterase 


 


Urine WBC (Auto) 


 


Urine RBC (Auto) 


 


Ur Epithelial Cells 


 


Urine Bacteria 


 


Hyaline Casts 


 


Urine Mucus 


 


RPR Titer  Nonreactive








LABS NOTED.


Assessment: 





04/17/18 16:06


COMPLETION OF DETOX REGIMEN.


Plan: 





PATIENT SCHEDULED FOR DISCHARGE FROM DETOX TODAY.

## 2018-04-17 NOTE — DS
[facility] Detox Discharge Summary


Admission Date: 


04/14/18





Discharge Date: 04/17/18





- History


Present History: Alcohol Dependence, Cannabis Dependence, Cocaine Dependence


Additional Comments: 





PATIENT DENIES CURRENT DETOX SYMPTOMS AND REPORTS THAT HE IS FEELING WELL 

OVERALL AT TIME OF DISCHARGE FROM DETOX. PATIENT GOING HOME. PATIENT ADVISED TO 

CONSIDER LOCAL 12-STEP / NA / AA OUTPATIENT SUPPORT GROUP PROGRAMS FOR 

AFTERCARE. PATIENT ADVISED TO OBTAIN PCP AT [facility] OUTPATIENT 

MEDICAL CLINIC (NEAR WHERE HE LIVES) TO FOLLOW-UP AFTER DISCHARGE FROM DETOX 

FOR MEDICAL ASSESSMENT AND FOR HISTORY OF CARDIAC/ECG ABNORMALITIES. PATIENT 

WAS DISCHARGED FROM DETOX UNIT IN STABLE MEDICAL CONDITION.


Pertinent Past History: 





Nicotine Dependence, HTN, Hypercholesterolemia, Chronic Low Pain, Chronic Renal 

Insufficiency.





- Physical Exam Results


Vital Signs: 


 Vital Signs











Temperature  97.1 F L  04/17/18 09:11


 


Pulse Rate  102 H  04/17/18 09:11


 


Respiratory Rate  16   04/17/18 09:11


 


Blood Pressure  138/96   04/17/18 09:11


 


O2 Sat by Pulse Oximetry (%)      











Pertinent Admission Physical Exam Findings: 





WITHDRAWAL SYMPTOMS.





 Laboratory Tests











  04/14/18 04/15/18 04/15/18





  14:30 07:30 07:30


 


WBC   4.5 


 


RBC   4.46 


 


Hgb   12.5  D 


 


Hct   38.2 


 


MCV   85.8 


 


MCH   28.0 


 


MCHC   32.7 


 


RDW   14.2 


 


Plt Count   229  D 


 


MPV   9.5 


 


Sodium    143


 


Potassium    3.8


 


Chloride    108 H


 


Carbon Dioxide    33 H


 


Anion Gap    2 L


 


BUN    25 H


 


Creatinine    1.6 H


 


Creat Clearance w eGFR    44.31


 


Random Glucose    94


 


Calcium    8.2 L


 


Total Bilirubin    < 0.1 L D


 


AST    27  D


 


ALT    33


 


Alkaline Phosphatase    129 H


 


Total Protein    6.8


 


Albumin    3.1 L


 


Urine Color  Yellow  


 


Urine Appearance  Clear  


 


Urine pH  5.0  


 


Ur Specific Gravity  1.026  


 


Urine Protein  Negative  


 


Urine Glucose (UA)  Negative  


 


Urine Ketones  Negative  


 


Urine Blood  Negative  


 


Urine Nitrite  Negative  


 


Urine Bilirubin  Negative  


 


Urine Urobilinogen  2.0  


 


Ur Leukocyte Esterase  Trace  


 


Urine WBC (Auto)  10  


 


Urine RBC (Auto)  1  


 


Ur Epithelial Cells  Rare  


 


Urine Bacteria  Rare  


 


Hyaline Casts  18  


 


Urine Mucus  Rare  


 


RPR Titer   














  04/15/18





  07:30


 


WBC 


 


RBC 


 


Hgb 


 


Hct 


 


MCV 


 


MCH 


 


MCHC 


 


RDW 


 


Plt Count 


 


MPV 


 


Sodium 


 


Potassium 


 


Chloride 


 


Carbon Dioxide 


 


Anion Gap 


 


BUN 


 


Creatinine 


 


Creat Clearance w eGFR 


 


Random Glucose 


 


Calcium 


 


Total Bilirubin 


 


AST 


 


ALT 


 


Alkaline Phosphatase 


 


Total Protein 


 


Albumin 


 


Urine Color 


 


Urine Appearance 


 


Urine pH 


 


Ur Specific Gravity 


 


Urine Protein 


 


Urine Glucose (UA) 


 


Urine Ketones 


 


Urine Blood 


 


Urine Nitrite 


 


Urine Bilirubin 


 


Urine Urobilinogen 


 


Ur Leukocyte Esterase 


 


Urine WBC (Auto) 


 


Urine RBC (Auto) 


 


Ur Epithelial Cells 


 


Urine Bacteria 


 


Hyaline Casts 


 


Urine Mucus 


 


RPR Titer  Nonreactive








LABS NOTED.





- Treatment


Hospital Course: Detox Protocol Followed, Detoxed Safely, Responded well, 

Discharged Condition Good


Patient has Accepted a Rehab Referral to: PT GOING HOME, ADVISED TO CONSIDER 

LOCAL 12-STEP/NA/AA SUPPORT GROUPS.





- Medication


Discharge Medications: 


Ambulatory Orders





Amlodipine Besylate [Norvasc -] 10 mg PO DAILY #30 tablet 03/12/18 


Hydrochlorothiazide [Hctz -] 25 mg PO DAILY #30 tablet 03/12/18 


Lisinopril [Prinivil] 10 mg PO DAILY #30 tablet 03/12/18 











- Diagnosis


(1) Alcohol dependence with uncomplicated withdrawal


Status: Acute   





(2) Cannabis dependence


Status: Chronic   





(3) Chronic low back pain


Status: Chronic   


Qualifiers: 


   Back pain laterality: midline   Sciatica presence: without sciatica   

Qualified Code(s): M54.5 - Low back pain; G89.29 - Other chronic pain; G89.29 - 

Other chronic pain   





(4) Chronic renal insufficiency, stage III (moderate)


Status: Chronic   





(5) Cocaine dependence, uncomplicated


Status: Chronic   





(6) Hypertension


Status: Chronic   


Qualifiers: 


   Hypertension type: essential hypertension   Qualified Code(s): I10 - 

Essential (primary) hypertension   





(7) Nicotine dependence


Status: Chronic   


Qualifiers: 


   Nicotine product type: cigarettes   Substance use status: uncomplicated   

Qualified Code(s): F17.210 - Nicotine dependence, cigarettes, uncomplicated   





- AMA


Did Patient Leave Against Medical Advice: No

## 2018-05-15 ENCOUNTER — HOSPITAL ENCOUNTER (INPATIENT)
Dept: HOSPITAL 74 - YASAS | Age: 63
LOS: 4 days | Discharge: HOME | End: 2018-05-19
Attending: SURGERY | Admitting: SURGERY
Payer: COMMERCIAL

## 2018-05-15 VITALS — BODY MASS INDEX: 22.1 KG/M2

## 2018-05-15 DIAGNOSIS — F17.210: ICD-10-CM

## 2018-05-15 DIAGNOSIS — E78.5: ICD-10-CM

## 2018-05-15 DIAGNOSIS — F14.20: ICD-10-CM

## 2018-05-15 DIAGNOSIS — I10: ICD-10-CM

## 2018-05-15 DIAGNOSIS — F12.20: ICD-10-CM

## 2018-05-15 DIAGNOSIS — F10.230: Primary | ICD-10-CM

## 2018-05-15 LAB
APPEARANCE UR: (no result)
BILIRUB UR STRIP.AUTO-MCNC: NEGATIVE MG/DL
COLOR UR: YELLOW
EPITH CASTS URNS QL MICRO: (no result) /HPF
KETONES UR QL STRIP: NEGATIVE
LEUKOCYTE ESTERASE UR QL STRIP.AUTO: (no result)
MUCOUS THREADS URNS QL MICRO: (no result)
NITRITE UR QL STRIP: NEGATIVE
PH UR: 5 [PH] (ref 5–8)
PROT UR QL STRIP: (no result)
PROT UR QL STRIP: NEGATIVE
SP GR UR: 1.03 (ref 1–1.03)
UROBILINOGEN UR STRIP-MCNC: (no result) MG/DL (ref 0.2–1)

## 2018-05-15 PROCEDURE — HZ2ZZZZ DETOXIFICATION SERVICES FOR SUBSTANCE ABUSE TREATMENT: ICD-10-PCS | Performed by: SURGERY

## 2018-05-15 RX ADMIN — NICOTINE SCH: 14 PATCH, EXTENDED RELEASE TRANSDERMAL at 15:17

## 2018-05-15 RX ADMIN — HYDROCHLOROTHIAZIDE SCH MG: 25 TABLET ORAL at 15:16

## 2018-05-15 RX ADMIN — LISINOPRIL SCH MG: 10 TABLET ORAL at 15:16

## 2018-05-15 RX ADMIN — AMLODIPINE BESYLATE SCH MG: 10 TABLET ORAL at 15:16

## 2018-05-15 RX ADMIN — Medication SCH MG: at 22:15

## 2018-05-15 NOTE — HP
CIWA Score





- CIWA Score


Nausea/Vomitin-Mild Nausea/No Vomiting


Muscle Tremors: 4-Moderate,w/Arms Extend


Anxiety: 4-Mod. Anxious/Guarded


Agitation: 4-Moderately Restless


Paroxysmal Sweats: 1-Minimal Palms Moist


Orientation: 0-Oriented


Tacttile Disturbances: 1-Very Mild Itch/Numbness


Auditory Disturbances: 0-None


Visual Disturbances: 0-None


Headache: 1-Very Mild


CIWA-Ar Total Score: 16





Admission ROS [facility]





- HPI


Chief Complaint: 





withdrawal sx from alcohol


denies mental illness refuses psychiatric evaluation


Allergies/Adverse Reactions: 


 Allergies











Allergy/AdvReac Type Severity Reaction Status Date / Time


 


No Known Allergies Allergy   Verified 05/15/18 13:13











History of Present Illness: 





63 years old male with long history of alcohol nicotine dependence has 

hypertension is admitted to detox


Exam Limitations: No Limitations





- Ebola screening


Have you traveled outside of the country in the last 21 days: No (N)


Have you had contact with anyone from an Ebola affected area: No


Have you been sick,other than usual withdrawal symptoms: No


Do you have a fever: No





- Review of Systems


Constitutional: Loss of Appetite, Changes in sleep, Unintentional Wgt. Loss, 

Unexplained wgt Loss


EENT: reports: No Symptoms Reported


Respiratory: reports: No Symptoms reported


Cardiac: reports: No Symptoms Reported


GI: reports: Nausea, Poor Appetite, Poor Fluid Intake, Abdominal cramping


: reports: No Symptoms Reported


Musculoskeletal: reports: Back Pain, Joint Pain (knees)


Integumentary: reports: No Symptoms Reported


Neuro: reports: Tremors


Endocrine: reports: No Symptoms Reported


Hematology: reports: No Symptoms Reported


Psychiatric: reports: Judgement Intact, Mood/Affect Appropiate, Orientated x3


Other Systems: Reviewed and Negative





Patient History





- Patient Medical History


Hx Anemia: No


Hx Asthma: No


Hx Chronic Obstructive Pulmonary Disease (COPD): No


Hx Cancer: No


Hx Cardiac Disorders: No


Hx Congestive Heart Failure: No


Hx Hypertension: Yes


Hx Hypercholesterolemia: Yes (In past, not taking any current medications.)


Hx Pacemaker: No


HX Cerebrovascular Accident: No


Hx Seizures: No


Hx Dementia: No


Hx Diabetes: No


Hx Gastrointestinal Disorders: No


Hx Liver Disease: No


Hx Genitourinary Disorders: No


Hx Sexually Transmitted Disorders: No


Hx Renal Disease (ESRD): No


Hx Thyroid Disease: No


Hx Human Immunodeficiency Virus (HIV): No


Hx Hepatitis C: No


Hx Depression: No


Hx Suicide Attempt: No


Hx Bipolar Disorder: No


Hx Schizophrenia: No





- Patient Surgical History


Past Surgical History: No


Hx Neurologic Surgery: No


Hx Cataract Extraction: No


Hx Cardiac Surgery: No


Hx Lung Surgery: No


Hx Breast Surgery: No


Hx Breast Biopsy: No


Hx Abdominal Surgery: No


Hx Appendectomy: No


Hx Cholecystectomy: No


Hx Genitourinary Surgery: No


Hx Orthopedic Surgery: No





- PPD History


Previous Implant?: Yes


Documented Results: Negative w/proof


Implanted On Prior [facility]mission?: Yes


Date: 17


Results: 0MM


PPD to be Administered?: No





- Smoking Cessation


Smoking history: Current every day smoker


Have you smoked in the past 12 months: Yes


Aproximately how many cigarettes per day: 10


Cigars Per Day: 0


Hx Chewing Tobacco Use: No


Initiated information on smoking cessation: Yes


'Breaking Loose' booklet given: 05/15/18





- Substance & Tx. History


Hx Alcohol Use: Yes


Hx Substance Use: Yes


Substance Use Type: Alcohol, Cocaine, Marijuana


Hx Substance Use Treatment: Yes (2018)





- Substances Abused


  ** Cocaine


Route: Inhalation


Frequency: Daily


Amount used: $80


Age of first use: 22


Date of Last Use: 18





  ** Alcohol-vodka/beer


Route: Oral


Frequency: Daily


Amount used: 1 pt./1-6 pk.


Age of first use: 22


Date of Last Use: 18





Family Disease History





- Family Disease History


Family Disease History: Heart Disease: Mother (CVA; .), Other: Father (

, no contact), Mother, Brother (one - living -healthy), Sister (one - 

living - healthy), Son (2 - living, healthy), Daughter (1 - living - healthy)





Admission Physical Exam [facility]





- Vital Signs


Vital Signs: 


 Vital Signs - 24 hr











  05/15/18





  09:49


 


Temperature 97.5 F L


 


Pulse Rate 61


 


Respiratory 20





Rate 


 


Blood Pressure 155/92














- Physical


General Appearance: Yes: Appropriately Dressed, Mild Distress, Thin, Tremorous, 

Irritable, Sweating, Anxious


HEENTM: Yes: Hearing grossly Normal, Normocephalic, Normal Voice


Respiratory: Yes: Chest Non-Tender, Lungs Clear, Normal Breath Sounds, No 

Respiratory Distress, No Accessory Muscle Use


Neck: Yes: Supple, Trachea in good position


Breast: Yes: Breasts Symetrical, No Discharge


Cardiology: Yes: Regular Rhythm, Regular Rate, S1, S2


Abdominal: Yes: Non Tender, Flat, Increased Bowel Sounds


Genitourinary: Yes: Within Normal Limits


Back: Yes: Normal Inspection


Musculoskeletal: Yes: full range of Motion, Gait Steady, Back pain


Extremities: Yes: Normal Inspection, Normal Range of Motion, Non-Tender, Tremors


Neurological: Yes: Fully Oriented, Alert, Motor Strength 5/5, Normal Mood/Affect

, Normal Response


Integumentary: Yes: Warm


Lymphatic: Yes: Within Normal Limits





- Diagnostic


(1) Alcohol dependence with uncomplicated withdrawal


Current Visit: Yes   Status: Acute   





(2) Hypertension


Current Visit: Yes   Status: Chronic   


Qualifiers: 


   Hypertension type: essential hypertension   Qualified Code(s): I10 - 

Essential (primary) hypertension   





(3) Nicotine dependence


Current Visit: Yes   Status: Acute   


Qualifiers: 


   Nicotine product type: cigarettes   Substance use status: in withdrawal   

Qualified Code(s): F17.213 - Nicotine dependence, cigarettes, with withdrawal   





Cleared for Admission [facility]





- Detox or Rehab


[facility] Level of Care: Medically Managed


Detox Regimen/Protocol: Librium





[facility] Breath Alcohol Content


Breath Alcohol Content: 0





Urine Drug Screen





- Control


Is Test Valid: Yes





- Results


Drug Screen Negative: No


Urine Drug Screen Results: THC-Marijuana, KAYLYNN-Cocaine, BZO-Benzodiazepines

## 2018-05-16 LAB
ALBUMIN SERPL-MCNC: 3.5 G/DL (ref 3.4–5)
ALP SERPL-CCNC: 86 U/L (ref 45–117)
ALT SERPL-CCNC: 44 U/L (ref 12–78)
ANION GAP SERPL CALC-SCNC: 6 MMOL/L (ref 8–16)
AST SERPL-CCNC: 43 U/L (ref 15–37)
BILIRUB SERPL-MCNC: 0.6 MG/DL (ref 0.2–1)
BUN SERPL-MCNC: 22 MG/DL (ref 7–18)
CALCIUM SERPL-MCNC: 8.8 MG/DL (ref 8.5–10.1)
CHLORIDE SERPL-SCNC: 104 MMOL/L (ref 98–107)
CO2 SERPL-SCNC: 32 MMOL/L (ref 21–32)
CREAT SERPL-MCNC: 1.9 MG/DL (ref 0.7–1.3)
DEPRECATED RDW RBC AUTO: 14.4 % (ref 11.9–15.9)
GLUCOSE SERPL-MCNC: 80 MG/DL (ref 74–106)
HCT VFR BLD CALC: 37.2 % (ref 35.4–49)
HGB BLD-MCNC: 12.2 GM/DL (ref 11.7–16.9)
MCH RBC QN AUTO: 28.2 PG (ref 25.7–33.7)
MCHC RBC AUTO-ENTMCNC: 32.7 G/DL (ref 32–35.9)
MCV RBC: 86.1 FL (ref 80–96)
PLATELET # BLD AUTO: 198 K/MM3 (ref 134–434)
PMV BLD: 10.2 FL (ref 7.5–11.1)
POTASSIUM SERPLBLD-SCNC: 3.9 MMOL/L (ref 3.5–5.1)
PROT SERPL-MCNC: 7.4 G/DL (ref 6.4–8.2)
RBC # BLD AUTO: 4.32 M/MM3 (ref 4–5.6)
SODIUM SERPL-SCNC: 142 MMOL/L (ref 136–145)
WBC # BLD AUTO: 3 K/MM3 (ref 4–10)

## 2018-05-16 RX ADMIN — Medication SCH TAB: at 10:14

## 2018-05-16 RX ADMIN — AMLODIPINE BESYLATE SCH MG: 10 TABLET ORAL at 10:14

## 2018-05-16 RX ADMIN — LISINOPRIL SCH MG: 10 TABLET ORAL at 10:14

## 2018-05-16 RX ADMIN — Medication SCH: at 22:43

## 2018-05-16 RX ADMIN — NICOTINE SCH: 14 PATCH, EXTENDED RELEASE TRANSDERMAL at 10:14

## 2018-05-16 RX ADMIN — Medication SCH MG: at 22:25

## 2018-05-16 RX ADMIN — HYDROCHLOROTHIAZIDE SCH MG: 25 TABLET ORAL at 10:14

## 2018-05-16 NOTE — PN
S CIWA





- CIWA Score


Nausea/Vomitin-Mild Nausea/No Vomiting


Muscle Tremors: 4-Moderate,w/Arms Extend


Anxiety: 3


Agitation: 3


Paroxysmal Sweats: 1-Minimal Palms Moist


Orientation: 0-Oriented


Tacttile Disturbances: 0-None


Auditory Disturbances: 0-None


Visual Disturbances: 0-None


Headache: 0-None Present


CIWA-Ar Total Score: 12





[facility] Progress Note (SOAP)


Subjective: 





patient refuses librium 10 am dose reported feeling ok today


stated sweating and tremor able to sleep at night


Objective: 





18 10:53


 Vital Signs











Temperature  97.9 F   18 10:17


 


Pulse Rate  64   18 10:17


 


Respiratory Rate  16   18 10:17


 


Blood Pressure  142/86   18 10:17


 


O2 Sat by Pulse Oximetry (%)      








 Laboratory Last Values











WBC  3.0 K/mm3 (4.0-10.0)  L D 18  06:00    


 


RBC  4.32 M/mm3 (4.00-5.60)   18  06:00    


 


Hgb  12.2 GM/dL (11.7-16.9)   18  06:00    


 


Hct  37.2 % (35.4-49)   18  06:00    


 


MCV  86.1 fl (80-96)   18  06:00    


 


MCH  28.2 pg (25.7-33.7)   18  06:00    


 


MCHC  32.7 g/dl (32.0-35.9)   18  06:00    


 


RDW  14.4 % (11.9-15.9)   18  06:00    


 


Plt Count  198 K/MM3 (134-434)   18  06:00    


 


MPV  10.2 fl (7.5-11.1)   18  06:00    


 


Sodium  142 mmol/L (136-145)   18  06:00    


 


Potassium  3.9 mmol/L (3.5-5.1)   18  06:00    


 


Chloride  104 mmol/L ()   18  06:00    


 


Carbon Dioxide  32 mmol/L (21-32)   18  06:00    


 


Anion Gap  6  (8-16)  L  18  06:00    


 


BUN  22 mg/dL (7-18)  H  18  06:00    


 


Creatinine  1.9 mg/dL (0.7-1.3)  H  18  06:00    


 


Creat Clearance w eGFR  36.22  (>60)   18  06:00    


 


Random Glucose  80 mg/dL ()   18  06:00    


 


Calcium  8.8 mg/dL (8.5-10.1)   18  06:00    


 


Total Bilirubin  0.6 mg/dL (0.2-1.0)  D 18  06:00    


 


AST  43 U/L (15-37)  H D 18  06:00    


 


ALT  44 U/L (12-78)  D 18  06:00    


 


Alkaline Phosphatase  86 U/L ()  D 18  06:00    


 


Total Protein  7.4 g/dl (6.4-8.2)   18  06:00    


 


Albumin  3.5 g/dl (3.4-5.0)   18  06:00    


 


Urine Color  Yellow   05/15/18  23:03    


 


Urine Appearance  Turbid   05/15/18  23:03    


 


Urine pH  5.0  (5.0-8.0)   05/15/18  23:03    


 


Ur Specific Gravity  1.028  (1.001-1.035)   05/15/18  23:03    


 


Urine Protein  1+  (NEGATIVE)  H  05/15/18  23:03    


 


Urine Glucose (UA)  Negative  (NEGATIVE)   05/15/18  23:    


 


Urine Ketones  Negative  (NEGATIVE)   05/15/18  23:    


 


Urine Blood  Negative  (NEGATIVE)   05/15/18  23:03    


 


Urine Nitrite  Negative  (NEGATIVE)   05/15/18  23:03    


 


Urine Bilirubin  Negative  (<2.0 mg/dL)   05/15/18  23:03    


 


Urine Urobilinogen  4.0 e.u/dl mg/dL (0.2-1.0)   05/15/18  23:03    


 


Ur Leukocyte Esterase  1+  (NEGATIVE)  H  05/15/18  23:03    


 


Urine WBC (Auto)  6 /hpf (3-5)   05/15/18  23:03    


 


Urine RBC (Auto)  None /hpf (0-3)   05/15/18  23:03    


 


Ur Epithelial Cells  Rare /HPF (FEW)   05/15/18  23:03    


 


Urine Mucus  Rare   05/15/18  23:03    








lab noted


creatinin 1.9


gfr 36


repeat camp


increase oral fluid


Assessment: 





18 10:59


withdrawal sx


dietary consultation for renal insufficient





18 11:02


fluid volium deficient


Plan: 





continue detox


dietary consultation


repeat camp


discontinue librium 50 mg 


replaced by libtium 25 mg q6h x 2 dosages


increase oral fluid

## 2018-05-17 LAB
ALBUMIN SERPL-MCNC: 2.9 G/DL (ref 3.4–5)
ALP SERPL-CCNC: 115 U/L (ref 45–117)
ALT SERPL-CCNC: 32 U/L (ref 12–78)
ANION GAP SERPL CALC-SCNC: 7 MMOL/L (ref 8–16)
AST SERPL-CCNC: 21 U/L (ref 15–37)
BILIRUB SERPL-MCNC: 0.3 MG/DL (ref 0.2–1)
BUN SERPL-MCNC: 26 MG/DL (ref 7–18)
CALCIUM SERPL-MCNC: 8.8 MG/DL (ref 8.5–10.1)
CHLORIDE SERPL-SCNC: 105 MMOL/L (ref 98–107)
CO2 SERPL-SCNC: 29 MMOL/L (ref 21–32)
CREAT SERPL-MCNC: 1.5 MG/DL (ref 0.7–1.3)
GLUCOSE SERPL-MCNC: 87 MG/DL (ref 74–106)
POTASSIUM SERPLBLD-SCNC: 3.9 MMOL/L (ref 3.5–5.1)
PROT SERPL-MCNC: 6.2 G/DL (ref 6.4–8.2)
SODIUM SERPL-SCNC: 141 MMOL/L (ref 136–145)

## 2018-05-17 RX ADMIN — AMLODIPINE BESYLATE SCH MG: 10 TABLET ORAL at 11:39

## 2018-05-17 RX ADMIN — Medication SCH: at 23:55

## 2018-05-17 RX ADMIN — LISINOPRIL SCH MG: 10 TABLET ORAL at 11:39

## 2018-05-17 RX ADMIN — HYDROCHLOROTHIAZIDE SCH MG: 25 TABLET ORAL at 11:39

## 2018-05-17 RX ADMIN — Medication SCH TAB: at 11:39

## 2018-05-17 RX ADMIN — Medication SCH MG: at 22:30

## 2018-05-17 RX ADMIN — NICOTINE SCH: 14 PATCH, EXTENDED RELEASE TRANSDERMAL at 11:39

## 2018-05-17 NOTE — PN
[facility] Progress Note


Note: 





 Vital Signs - 24 hr











  05/16/18 05/16/18 05/17/18





  18:50 22:56 00:30


 


Temperature 97.5 F L 97.7 F 


 


Pulse Rate 85 81 


 


Respiratory 18 18 18





Rate   


 


Blood Pressure 136/81 155/98 














  05/17/18 05/17/18 05/17/18





  03:30 06:39 10:17


 


Temperature  97.7 F 


 


Pulse Rate  65 91 H


 


Respiratory 18 18 18





Rate   


 


Blood Pressure  144/85 142/107














  05/17/18 05/17/18





  14:54 17:04


 


Temperature 98.1 F 98.2 F


 


Pulse Rate 77 96 H


 


Respiratory 18 20





Rate  


 


Blood Pressure 147/94 145/106








Patient with asymptomatic elevated BP  predominantly diastolic 


one time dose clonidine 0.1 mg stat 


increase fluids 


continue to monitor

## 2018-05-17 NOTE — PN
[facility] CIWA





- CIWA Score


Nausea/Vomitin-Mild Nausea/No Vomiting


Muscle Tremors: 3


Anxiety: 3


Agitation: 3


Paroxysmal Sweats: 1-Minimal Palms Moist


Orientation: 0-Oriented


Tacttile Disturbances: 0-None


Auditory Disturbances: 0-None


Visual Disturbances: 0-None


Headache: 0-None Present


CIWA-Ar Total Score: 11





[facility] Progress Note (SOAP)


Subjective: 





sweat tremor anxiety restlessness gi distress


Objective: 





18 09:43


 Vital Signs











Temperature  97.7 F   18 06:39


 


Pulse Rate  65   18 06:39


 


Respiratory Rate  18   18 06:39


 


Blood Pressure  144/85   18 06:39


 


O2 Sat by Pulse Oximetry (%)      








 Laboratory Last Values











WBC  3.0 K/mm3 (4.0-10.0)  L D 18  06:00    


 


RBC  4.32 M/mm3 (4.00-5.60)   18  06:00    


 


Hgb  12.2 GM/dL (11.7-16.9)   18  06:00    


 


Hct  37.2 % (35.4-49)   18  06:00    


 


MCV  86.1 fl (80-96)   18  06:00    


 


MCH  28.2 pg (25.7-33.7)   18  06:00    


 


MCHC  32.7 g/dl (32.0-35.9)   18  06:00    


 


RDW  14.4 % (11.9-15.9)   18  06:00    


 


Plt Count  198 K/MM3 (134-434)   18  06:00    


 


MPV  10.2 fl (7.5-11.1)   18  06:00    


 


Sodium  142 mmol/L (136-145)   18  06:00    


 


Potassium  3.9 mmol/L (3.5-5.1)   18  06:00    


 


Chloride  104 mmol/L ()   18  06:00    


 


Carbon Dioxide  32 mmol/L (21-32)   18  06:00    


 


Anion Gap  6  (8-16)  L  18  06:00    


 


BUN  22 mg/dL (7-18)  H  18  06:00    


 


Creatinine  1.9 mg/dL (0.7-1.3)  H  18  06:00    


 


Creat Clearance w eGFR  36.22  (>60)   18  06:00    


 


Random Glucose  80 mg/dL ()   18  06:00    


 


Calcium  8.8 mg/dL (8.5-10.1)   18  06:00    


 


Total Bilirubin  0.6 mg/dL (0.2-1.0)  D 18  06:00    


 


AST  43 U/L (15-37)  H D 18  06:00    


 


ALT  44 U/L (12-78)  D 18  06:00    


 


Alkaline Phosphatase  86 U/L ()  D 18  06:00    


 


Total Protein  7.4 g/dl (6.4-8.2)   18  06:00    


 


Albumin  3.5 g/dl (3.4-5.0)   18  06:00    


 


Urine Color  Yellow   05/15/18  23:03    


 


Urine Appearance  Turbid   05/15/18  23:03    


 


Urine pH  5.0  (5.0-8.0)   05/15/18  23:03    


 


Ur Specific Gravity  1.028  (1.001-1.035)   05/15/18  23:03    


 


Urine Protein  1+  (NEGATIVE)  H  05/15/18  23:03    


 


Urine Glucose (UA)  Negative  (NEGATIVE)   05/15/18  23:03    


 


Urine Ketones  Negative  (NEGATIVE)   05/15/18  23:03    


 


Urine Blood  Negative  (NEGATIVE)   05/15/18  23:    


 


Urine Nitrite  Negative  (NEGATIVE)   05/15/18  23:03    


 


Urine Bilirubin  Negative  (<2.0 mg/dL)   05/15/18  23:03    


 


Urine Urobilinogen  4.0 e.u/dl mg/dL (0.2-1.0)   05/15/18  23:03    


 


Ur Leukocyte Esterase  1+  (NEGATIVE)  H  05/15/18  23:03    


 


Urine WBC (Auto)  6 /hpf (3-5)   05/15/18  23:03    


 


Urine RBC (Auto)  None /hpf (0-3)   05/15/18  23:03    


 


Ur Epithelial Cells  Rare /HPF (FEW)   05/15/18  23:03    


 


Urine Mucus  Rare   05/15/18  23:03    


 


RPR Titer  Nonreactive  (NONREACTIVE)   18  06:00    








lab noted


18 09:45


chemistry repeat results pending


fgr gfr 


Assessment: 





18 09:47


withdrawal sx


18 09:48


rule out renal insufficient


Plan: 





continue detox


lab pending

## 2018-05-18 RX ADMIN — HYDROCHLOROTHIAZIDE SCH MG: 25 TABLET ORAL at 10:34

## 2018-05-18 RX ADMIN — LISINOPRIL SCH MG: 10 TABLET ORAL at 10:33

## 2018-05-18 RX ADMIN — Medication SCH TAB: at 10:33

## 2018-05-18 RX ADMIN — NICOTINE SCH: 14 PATCH, EXTENDED RELEASE TRANSDERMAL at 10:34

## 2018-05-18 RX ADMIN — Medication SCH MG: at 22:06

## 2018-05-18 RX ADMIN — AMLODIPINE BESYLATE SCH MG: 10 TABLET ORAL at 10:33

## 2018-05-18 RX ADMIN — Medication SCH: at 23:18

## 2018-05-18 NOTE — EKG
Test Reason : 

Blood Pressure : ***/*** mmHG

Vent. Rate : 055 BPM     Atrial Rate : 055 BPM

   P-R Int : 148 ms          QRS Dur : 110 ms

    QT Int : 520 ms       P-R-T Axes : -05 091 117 degrees

   QTc Int : 497 ms

 

SINUS BRADYCARDIA

RIGHTWARD AXIS

VOLTAGE CRITERIA FOR LEFT VENTRICULAR HYPERTROPHY

CANNOT RULE OUT SEPTAL INFARCT (CITED ON OR BEFORE 06-NOV-2017)

ABNORMAL ECG

WHEN COMPARED WITH ECG OF 14-APR-2018 12:37,

VENT. RATE HAS DECREASED BY  27 BPM

QUESTIONABLE CHANGE IN QRS DURATION

QUESTIONABLE CHANGE IN INITIAL FORCES OF SEPTAL LEADS

Confirmed by VAN ELLIS MD ([id number]) on 5/16/2018 12:04:17 PM

 

Referred By:             Confirmed By:VAN ELLIS MD

## 2018-05-18 NOTE — PN
[facility] Progress Note (SOAP)


Subjective: 





feeling better no sweat less tremor slept through the night


Objective: 





05/18/18 12:18


 Vital Signs











Temperature  98.2 F   05/18/18 09:50


 


Pulse Rate  91 H  05/18/18 09:50


 


Respiratory Rate  20   05/18/18 09:50


 


Blood Pressure  140/91   05/18/18 09:50


 


O2 Sat by Pulse Oximetry (%)      








 Laboratory Last Values











WBC  3.0 K/mm3 (4.0-10.0)  L D 05/16/18  06:00    


 


RBC  4.32 M/mm3 (4.00-5.60)   05/16/18  06:00    


 


Hgb  12.2 GM/dL (11.7-16.9)   05/16/18  06:00    


 


Hct  37.2 % (35.4-49)   05/16/18  06:00    


 


MCV  86.1 fl (80-96)   05/16/18  06:00    


 


MCH  28.2 pg (25.7-33.7)   05/16/18  06:00    


 


MCHC  32.7 g/dl (32.0-35.9)   05/16/18  06:00    


 


RDW  14.4 % (11.9-15.9)   05/16/18  06:00    


 


Plt Count  198 K/MM3 (134-434)   05/16/18  06:00    


 


MPV  10.2 fl (7.5-11.1)   05/16/18  06:00    


 


Sodium  141 mmol/L (136-145)   05/17/18  07:00    


 


Potassium  3.9 mmol/L (3.5-5.1)   05/17/18  07:00    


 


Chloride  105 mmol/L ()   05/17/18  07:00    


 


Carbon Dioxide  29 mmol/L (21-32)   05/17/18  07:00    


 


Anion Gap  7  (8-16)  L  05/17/18  07:00    


 


BUN  26 mg/dL (7-18)  H  05/17/18  07:00    


 


Creatinine  1.5 mg/dL (0.7-1.3)  H D 05/17/18  07:00    


 


Creat Clearance w eGFR  47.58  (>60)   05/17/18  07:00    


 


Random Glucose  87 mg/dL ()   05/17/18  07:00    


 


Calcium  8.8 mg/dL (8.5-10.1)   05/17/18  07:00    


 


Total Bilirubin  0.3 mg/dL (0.2-1.0)  D 05/17/18  07:00    


 


AST  21 U/L (15-37)  D 05/17/18  07:00    


 


ALT  32 U/L (12-78)  D 05/17/18  07:00    


 


Alkaline Phosphatase  115 U/L ()  D 05/17/18  07:00    


 


Total Protein  6.2 g/dl (6.4-8.2)  L  05/17/18  07:00    


 


Albumin  2.9 g/dl (3.4-5.0)  L  05/17/18  07:00    


 


Urine Color  Yellow   05/15/18  23:03    


 


Urine Appearance  Turbid   05/15/18  23:03    


 


Urine pH  5.0  (5.0-8.0)   05/15/18  23:03    


 


Ur Specific Gravity  1.028  (1.001-1.035)   05/15/18  23:03    


 


Urine Protein  1+  (NEGATIVE)  H  05/15/18  23:03    


 


Urine Glucose (UA)  Negative  (NEGATIVE)   05/15/18  23:03    


 


Urine Ketones  Negative  (NEGATIVE)   05/15/18  23:03    


 


Urine Blood  Negative  (NEGATIVE)   05/15/18  23:03    


 


Urine Nitrite  Negative  (NEGATIVE)   05/15/18  23:03    


 


Urine Bilirubin  Negative  (<2.0 mg/dL)   05/15/18  23:03    


 


Urine Urobilinogen  4.0 e.u/dl mg/dL (0.2-1.0)   05/15/18  23:03    


 


Ur Leukocyte Esterase  1+  (NEGATIVE)  H  05/15/18  23:03    


 


Urine WBC (Auto)  6 /hpf (3-5)   05/15/18  23:03    


 


Urine RBC (Auto)  None /hpf (0-3)   05/15/18  23:03    


 


Ur Epithelial Cells  Rare /HPF (FEW)   05/15/18  23:03    


 


Urine Mucus  Rare   05/15/18  23:03    


 


RPR Titer  Nonreactive  (NONREACTIVE)   05/16/18  06:00    








lab noted


rule out renal insufficient


Assessment: 





05/18/18 12:21


mild withdrawal sx


renal function deficit


Plan: 





medically supervised detox


encourage the patient follow up with primary care hypertension provider for 

renal function testing nephrology consultation

## 2018-05-19 VITALS — DIASTOLIC BLOOD PRESSURE: 90 MMHG | TEMPERATURE: 96.3 F | SYSTOLIC BLOOD PRESSURE: 138 MMHG | HEART RATE: 71 BPM

## 2018-05-19 NOTE — PN
[facility] Progress Note (SOAP)


Subjective: 





no complaints offered


Objective: 





05/19/18 16:46


A & O x 3


 Vital Signs











Temperature  96.3 F L  05/19/18 09:00


 


Pulse Rate  71   05/19/18 09:00


 


Respiratory Rate  18   05/19/18 09:00


 


Blood Pressure  138/90   05/19/18 09:00


 


O2 Sat by Pulse Oximetry (%)      











Assessment: 





05/19/18 16:46


detox completed


pt tolerated well


Plan: 





for discharge

## 2018-05-19 NOTE — DS
[facility] Detox Discharge Summary


Admission Date: 


05/15/18





Discharge Date: 05/19/18





- History


Additional Comments: 





For d/c home


A & O x 3, gait steady,


Not in distress


Will do aftercare by "going to meetings"


Prescriptions sent to [facility] pharmacy, [address]








- Physical Exam Results


Vital Signs: 


 Vital Signs











Temperature  96.3 F L  05/19/18 09:00


 


Pulse Rate  71   05/19/18 09:00


 


Respiratory Rate  18   05/19/18 09:00


 


Blood Pressure  138/90   05/19/18 09:00


 


O2 Sat by Pulse Oximetry (%)      














- Treatment


Hospital Course: Detox Protocol Followed, Detoxed Safely, Responded well, 

Discharged Condition Good


Patient has Accepted a Rehab Referral to: [facility] meeting





- Medication


Discharge Medications: 


Ambulatory Orders





Amlodipine Besylate [Norvasc -] 10 mg PO DAILY #30 tablet 05/18/18 


Hydrochlorothiazide [Hctz -] 25 mg PO DAILY #30 tablet 05/18/18 


Lisinopril [Prinivil] 10 mg PO DAILY #30 tablet 05/18/18 


Carvedilol [Coreg -] 3.125 mg PO DAILY 30 Days #30 tablet 05/19/18 


Isosorbide Mononitrate [Isosorbide Mononitrate ER] 30 mg PO DAILY 30 Days #30 

tab.er.24h 05/19/18 


Spironolactone 12.5 mg PO DAILY 30 Days #30 tablet 05/19/18 











- Diagnosis


(1) Alcohol dependence with uncomplicated withdrawal


Status: Acute   





(2) Hyperlipidemia


Status: Acute   





(3) Nicotine dependence


Status: Acute   


Qualifiers: 


   Nicotine product type: cigarettes   Substance use status: in withdrawal   

Qualified Code(s): F17.213 - Nicotine dependence, cigarettes, with withdrawal   





(4) Cannabis dependence


Status: Chronic   





(5) Cocaine dependence, uncomplicated


Status: Chronic   





(6) Hypertension


Status: Chronic   


Qualifiers: 


   Hypertension type: essential hypertension   Qualified Code(s): I10 - 

Essential (primary) hypertension   





- AMA


Did Patient Leave Against Medical Advice: No

## 2018-05-23 NOTE — EKG
Test Reason : 

Blood Pressure : ***/*** mmHG

Vent. Rate : 064 BPM     Atrial Rate : 064 BPM

   P-R Int : 166 ms          QRS Dur : 096 ms

    QT Int : 454 ms       P-R-T Axes : 059 -27 -72 degrees

   QTc Int : 468 ms

 

NORMAL SINUS RHYTHM

POSSIBLE LEFT ATRIAL ENLARGEMENT

LEFT VENTRICULAR HYPERTROPHY

CANNOT RULE OUT SEPTAL INFARCT (CITED ON OR BEFORE 06-NOV-2017)

LATERAL INJURY PATTERN

** ** ACUTE MI / STEMI ** **

ABNORMAL ECG

WHEN COMPARED WITH ECG OF 15-MAY-2018 15:28,

QRS AXIS SHIFTED LEFT

QUESTIONABLE CHANGE IN INITIAL FORCES OF SEPTAL LEADS

T WAVE INVERSION NOW EVIDENT IN INFERIOR LEADS

T WAVE INVERSION LESS EVIDENT IN LATERAL LEADS

Confirmed by CALEB SETH, VAN ([id number]) on 5/23/2018 11:13:40 AM

 

Referred By:             Confirmed By:VAN ELLIS MD

## 2019-11-24 ENCOUNTER — HOSPITAL ENCOUNTER (INPATIENT)
Dept: HOSPITAL 74 - YASAS | Age: 64
LOS: 3 days | Discharge: HOME | End: 2019-11-27
Attending: ALLERGY & IMMUNOLOGY | Admitting: ALLERGY & IMMUNOLOGY
Payer: COMMERCIAL

## 2019-11-24 VITALS — BODY MASS INDEX: 23.7 KG/M2

## 2019-11-24 DIAGNOSIS — M54.5: ICD-10-CM

## 2019-11-24 DIAGNOSIS — G89.29: ICD-10-CM

## 2019-11-24 DIAGNOSIS — F12.20: ICD-10-CM

## 2019-11-24 DIAGNOSIS — F14.20: ICD-10-CM

## 2019-11-24 DIAGNOSIS — F17.210: ICD-10-CM

## 2019-11-24 DIAGNOSIS — F19.24: ICD-10-CM

## 2019-11-24 DIAGNOSIS — E78.00: ICD-10-CM

## 2019-11-24 DIAGNOSIS — F10.230: Primary | ICD-10-CM

## 2019-11-24 PROCEDURE — HZ2ZZZZ DETOXIFICATION SERVICES FOR SUBSTANCE ABUSE TREATMENT: ICD-10-PCS | Performed by: ALLERGY & IMMUNOLOGY

## 2019-11-24 RX ADMIN — HYDROCHLOROTHIAZIDE SCH MG: 25 TABLET ORAL at 15:15

## 2019-11-24 RX ADMIN — Medication SCH MG: at 21:33

## 2019-11-24 RX ADMIN — AMLODIPINE BESYLATE SCH MG: 10 TABLET ORAL at 15:16

## 2019-11-24 NOTE — HP
CIWA Score


Nausea/Vomitin


Muscle Tremors: 3


Anxiety: 2


Agitation: 2


Paroxysmal Sweats: 1-Minimal Palms Moist


Orientation: 0-Oriented


Tacttile Disturbances: 1-Very Mild Itch/Numbness


Auditory Disturbances: 0-None


Visual Disturbances: 0-None


Headache: 2-Mild


CIWA-Ar Total Score: 13





- Admission Criteria


[facility] Guidelines: Admission for Medically Managed Detox: 


Requires at least one of the followin. CIWA greater than 12


2. Seizures within the past 24 hours


3. Delirium tremens within the past 24 hours


4. Hallucinations within the past 24 hours


5. Acute intervention needed for co  occurring medical disorder


6. Acute intervention needed for co  occurring psychiatric disorder


7. Severe withdrawal that cannot be handled at a lower level of care (continued


    vomiting, continued diarrhea, abnormal vital signs) requiring intravenous


    medication and/or fluids


8. Pregnancy








Admitting History and Physical





- Admission


Chief Complaint: i need help to stop dring alcohol,cocaine and marijuana


History Source: Patient


Limitations to Obtaining History: No Limitations





- Past Medical History


Cardiovascular: Yes: HTN





- Past Surgical History


Additional Past Surgical History: 





left hip replacement in 2018





- Smoking History


Smoking history: Current every day smoker


Have you smoked in the past 12 months: Yes


Aproximately how many cigarettes per day: 10





- Alcohol/Substance Use


Hx Alcohol Use: Yes





- Social History


Usual Living Arrangement: Yes: Alone, Other (homeless)


History of Recent Travel: No


Other Social History: homeless,unemployed





Admission ROS S





- HPI


Chief Complaint: 





i need help to stop drinking alcohol,cocaine,marijuana


Allergies/Adverse Reactions: 


 Allergies











Allergy/AdvReac Type Severity Reaction Status Date / Time


 


No Known Allergies Allergy   Verified 19 12:49











History of Present Illness: 





this 64 years old male with alcohol ,cocaine and marijuana dependence,seeking 

detox,withdrawal symptom,


last detox 10/19 did not recall the facility


hypertension on med


denied seizure


denied syncope


multiple admissions in detox


nicotine dependence 1o cigarette


longest period of sobriety 4 years





- Ebola screening


Have you traveled outside of the country in the last 21 days: No (N)


Have you had contact with anyone from an Ebola affected area: No


Do you have a fever: No





- Review of Systems


Constitutional: Night Sweats, Weakness, Unintentional Wgt. Loss


EENT: reports: Nose Congestion


Respiratory: reports: No Symptoms reported


Cardiac: reports: No Symptoms Reported


GI: reports: Nausea, Poor Appetite, Indigestion


: reports: No Symptoms Reported


Musculoskeletal: reports: Back Pain, Muscle Pain, Other (s/p left hip 

replacement)


Integumentary: reports: Dryness


Neuro: reports: Headache, Tremors


Endocrine: reports: No Symptoms Reported


Hematology: reports: No Symptoms Reported


Psychiatric: reports: No Sypmtoms Reported, Judgement Intact, Mood/Affect 

Appropiate, Orientated x3


Other Systems: Reviewed and Negative





Patient History





- Patient Medical History


Hx Anemia: No


Hx Asthma: No


Hx Chronic Obstructive Pulmonary Disease (COPD): No


Hx Cancer: No


Hx Cardiac Disorders: No


Hx Congestive Heart Failure: No


Hx Hypertension: Yes (ON MEDICATION)


Hx Hypercholesterolemia: Yes (on lipitor)


Hx Pacemaker: No


HX Cerebrovascular Accident: No


Hx Seizures: No


Hx Dementia: No


Hx Diabetes: No


Hx Gastrointestinal Disorders: No


Hx Liver Disease: No


Hx Genitourinary Disorders: No


Hx Sexually Transmitted Disorders: No


Hx Renal Disease (ESRD): No


Hx Thyroid Disease: No


Hx Human Immunodeficiency Virus (HIV): No (last  negative)


Hx Hepatitis C: No


Hx Depression: No


Hx Suicide Attempt: No


Hx Bipolar Disorder: No


Hx Schizophrenia: No


Other Medical History: no suiicdal,no homicidal





- Patient Surgical History


Past Surgical History: No


Hx Neurologic Surgery: No


Hx Cataract Extraction: No


Hx Cardiac Surgery: No


Hx Lung Surgery: No


Hx Breast Surgery: No


Hx Breast Biopsy: No


Hx Abdominal Surgery: No


Hx Appendectomy: No


Hx Cholecystectomy: No


Hx Genitourinary Surgery: No


Hx  Section: No


Hx Orthopedic Surgery: No


Anesthesia Reaction: No





- PPD History


Previous Implant?: Yes


Documented Results: Negative w/o proof


Date: 17


Results: NEGATIVE


PPD to be Administered?: Yes





- Smoking Cessation


Smoking history: Current every day smoker


Have you smoked in the past 12 months: Yes


Aproximately how many cigarettes per day: 10


Cigars Per Day: 0


Hx Chewing Tobacco Use: No


Initiated information on smoking cessation: Yes


'Breaking Loose' booklet given: 19





- Substance & Tx. History


Hx Alcohol Use: Yes


Hx Substance Use: Yes


Substance Use Type: Alcohol, Cocaine, Marijuana


Hx Substance Use Treatment: Yes (10/19 unknown facility)





- Substances abused


  ** Alcohol


Substance route: Oral


Frequency: Daily


Amount used: 2 PINTS OF VODKA


Age of first use: 19


Date of last use: 19





  ** Cocaine


Substance route: Inhalation


Frequency: Daily


Amount used: 1-2 GRAMS


Age of first use: 22


Date of last use: 19





  ** Marijuana/Hashish


Substance route: Smoking


Frequency: Daily


Amount used: 1 GRAM


Age of first use: 16


Date of last use: 19





Admission Physical Exam [facility]





- Vital Signs


Vital Signs: 


 Vital Signs - 24 hr











  19





  12:51


 


Temperature 97.0 F L


 


Pulse Rate 79


 


Respiratory 20





Rate 


 


Blood Pressure 164/104 H














- Physical


General Appearance: Yes: Moderate Distress, Tremorous, Irritable, Sweating, 

Anxious


HEENTM: Yes: Normal ENT Inspection, COMFORT, Pharynx Normal


Respiratory: Yes: Lungs Clear, Normal Breath Sounds, No Respiratory Distress


Neck: Yes: Within Normal Limits, Supple, Trachea in good position


Breast: Yes: Within Normal Limits


Cardiology: Yes: Within Normal Limits, Regular Rhythm, Regular Rate, S1, S2


Abdominal: Yes: Within Normal Limits, Normal Bowel Sounds, Non Tender, Flat


Genitourinary: Yes: Within Normal Limits


Back: Yes: Muscle Spasm


Musculoskeletal: Yes: Back pain, Muscle Pain


Extremities: Yes: Tremors


Neurological: Yes: CNs II-XII NML intact, Alert, Motor Strength 5/5


Integumentary: Yes: Dry


Lymphatic: Yes: Within Normal Limits





- Diagnostic


(1) Alcohol dependence with uncomplicated withdrawal


Current Visit: No   Status: Acute   





(2) Cannabis dependence


Current Visit: No   Status: Acute   





(3) Cocaine dependence, uncomplicated


Current Visit: No   Status: Acute   





(4) Nicotine dependence


Current Visit: No   Status: Acute   


Qualifiers: 


   Nicotine product type: cigarettes   Substance use status: in withdrawal   

Qualified Code(s): F17.213 - Nicotine dependence, cigarettes, with withdrawal   





(5) Chronic low back pain


Current Visit: No   Status: Chronic   


Qualifiers: 


   Back pain laterality: unspecified   Sciatica presence: without sciatica   

Qualified Code(s): M54.5 - Low back pain; G89.29 - Other chronic pain   





(6) Hyperlipidemia


Current Visit: No   Status: Chronic   


Qualifiers: 


   Hyperlipidemia type: unspecified   Qualified Code(s): E78.5 - Hyperlipidemia

, unspecified   





(7) Hypertension


Current Visit: No   Status: Chronic   


Qualifiers: 


   Hypertension type: essential hypertension   Qualified Code(s): I10 - 

Essential (primary) hypertension   





(8) Low back pain


Current Visit: Yes   Status: Acute   





Cleared for Admission [facility]





- Detox or Rehab


[facility] Level of Care: Medically Managed


Detox Regimen/Protocol: Valium





Breathalyzer





- Breathalyzer


Breathalyzer: 0





Urine Drug Screen





- Test Device


Lot number: [id number]


Expiration date: 21





- Control


Is test valid?: Yes





- Results


Drug screen NEGATIVE: No


Urine drug screen results: THC-Marijuana, KAYLYNN-Cocaine, BZO-Benzodiazepines





Inpatient Rehab Admission





- Rehab Decision to Admit


Inpatient rehab admission?: No

## 2019-11-25 LAB
ALBUMIN SERPL-MCNC: 2.8 G/DL (ref 3.4–5)
ALP SERPL-CCNC: 115 U/L (ref 45–117)
ALT SERPL-CCNC: 39 U/L (ref 13–61)
ANION GAP SERPL CALC-SCNC: 6 MMOL/L (ref 8–16)
AST SERPL-CCNC: 35 U/L (ref 15–37)
BILIRUB SERPL-MCNC: 0.3 MG/DL (ref 0.2–1)
BUN SERPL-MCNC: 32.6 MG/DL (ref 7–18)
CALCIUM SERPL-MCNC: 8.6 MG/DL (ref 8.5–10.1)
CHLORIDE SERPL-SCNC: 107 MMOL/L (ref 98–107)
CO2 SERPL-SCNC: 28 MMOL/L (ref 21–32)
CREAT SERPL-MCNC: 1.8 MG/DL (ref 0.55–1.3)
DEPRECATED RDW RBC AUTO: 14.4 % (ref 11.9–15.9)
GLUCOSE SERPL-MCNC: 105 MG/DL (ref 74–106)
HCT VFR BLD CALC: 35.3 % (ref 35.4–49)
HGB BLD-MCNC: 11.6 GM/DL (ref 11.7–16.9)
MCH RBC QN AUTO: 27.8 PG (ref 25.7–33.7)
MCHC RBC AUTO-ENTMCNC: 32.8 G/DL (ref 32–35.9)
MCV RBC: 84.6 FL (ref 80–96)
PLATELET # BLD AUTO: 198 K/MM3 (ref 134–434)
PMV BLD: 9.6 FL (ref 7.5–11.1)
POTASSIUM SERPLBLD-SCNC: 3.6 MMOL/L (ref 3.5–5.1)
PROT SERPL-MCNC: 6.3 G/DL (ref 6.4–8.2)
RBC # BLD AUTO: 4.17 M/MM3 (ref 4–5.6)
SODIUM SERPL-SCNC: 141 MMOL/L (ref 136–145)
WBC # BLD AUTO: 3.7 K/MM3 (ref 4–10)

## 2019-11-25 RX ADMIN — HYDROCHLOROTHIAZIDE SCH MG: 25 TABLET ORAL at 12:03

## 2019-11-25 RX ADMIN — Medication SCH TAB: at 10:29

## 2019-11-25 RX ADMIN — AMLODIPINE BESYLATE SCH MG: 10 TABLET ORAL at 10:29

## 2019-11-25 RX ADMIN — Medication SCH MG: at 22:30

## 2019-11-25 NOTE — PN
[facility] CIWA





- CIWA Score


Nausea/Vomitin-Mild Nausea/No Vomiting


Muscle Tremors: 3


Anxiety: 3


Agitation: 2


Paroxysmal Sweats: 2


Orientation: 0-Oriented


Tacttile Disturbances: 0-None


Auditory Disturbances: 0-None


Visual Disturbances: 0-None


Headache: 1-Very Mild


CIWA-Ar Total Score: 12





[facility] Progress Note (SOAP)


Subjective: 





64 years old male admitted on 19 for alcohol withdrawal sx management 

treated with valium detox regimen 


ate breakfast no trouble chewing swallowing tolerated food and fluid well


resting in bed feeling tired prefers to stay in bed today 


Objective: 





19 10:16


 Vital Signs











Temperature  96.1 F L  19 09:15


 


Pulse Rate  96 H  19 09:15


 


Respiratory Rate  18   19 09:15


 


Blood Pressure  130/92   19 09:15


 


O2 Sat by Pulse Oximetry (%)      








 Laboratory Last Values











WBC  3.7 K/mm3 (4.0-10.0)  L  19  08:00    


 


RBC  4.17 M/mm3 (4.00-5.60)   19  08:00    


 


Hgb  11.6 GM/dL (11.7-16.9)  L  19  08:00    


 


Hct  35.3 % (35.4-49)  L  19  08:00    


 


MCV  84.6 fl (80-96)   19  08:00    


 


MCH  27.8 pg (25.7-33.7)   19  08:00    


 


MCHC  32.8 g/dl (32.0-35.9)   19  08:00    


 


RDW  14.4 % (11.9-15.9)   19  08:00    


 


Plt Count  198 K/MM3 (134-434)   19  08:00    


 


MPV  9.6 fl (7.5-11.1)   19  08:00    


 


Sodium  141 mmol/L (136-145)   19  08:00    


 


Potassium  3.6 mmol/L (3.5-5.1)   19  08:00    


 


Chloride  107 mmol/L ()   19  08:00    


 


Carbon Dioxide  28 mmol/L (21-32)   19  08:00    


 


Anion Gap  6 MMOL/L (8-16)  L  19  08:00    


 


BUN  32.6 mg/dL (7-18)  H  19  08:00    


 


Creatinine  1.8 mg/dL (0.55-1.3)  H  19  08:00    


 


Est GFR (CKD-EPI)AfAm  45.73   19  08:00    


 


Est GFR (CKD-EPI)NonAf  39.46   19  08:00    


 


Random Glucose  105 mg/dL ()   19  08:00    


 


Calcium  8.6 mg/dL (8.5-10.1)   19  08:00    


 


Total Bilirubin  0.3 mg/dL (0.2-1)   19  08:00    


 


AST  35 U/L (15-37)   19  08:00    


 


ALT  39 U/L (13-61)   19  08:00    


 


Alkaline Phosphatase  115 U/L ()   19  08:00    


 


Total Protein  6.3 g/dl (6.4-8.2)  L  19  08:00    


 


Albumin  2.8 g/dl (3.4-5.0)  L  19  08:00    








lab noted


Assessment: 





19 10:17


alcohol withdrawal sx


Plan: 





continue valium detox regimen

## 2019-11-26 RX ADMIN — Medication SCH MG: at 22:49

## 2019-11-26 RX ADMIN — HYDROCHLOROTHIAZIDE SCH MG: 25 TABLET ORAL at 09:51

## 2019-11-26 RX ADMIN — AMLODIPINE BESYLATE SCH MG: 10 TABLET ORAL at 09:50

## 2019-11-26 RX ADMIN — Medication SCH TAB: at 09:51

## 2019-11-26 NOTE — PN
[facility] CIWA





- CIWA Score


Nausea/Vomitin-No Nausea/No Vomiting


Muscle Tremors: 2


Anxiety: 2


Agitation: 2


Paroxysmal Sweats: 1-Minimal Palms Moist


Orientation: 0-Oriented


Tacttile Disturbances: 0-None


Auditory Disturbances: 0-None


Visual Disturbances: 0-None


Headache: 0-None Present


CIWA-Ar Total Score: 7





[facility] Progress Note (SOAP)


Subjective: 





64 years old male admitted on 19 for alcohol withdrawal sx management 

treated with valim detox regimen feeling better ate breakfast and lunch 

tolerated food and fluid well ambulating on hallway discuss aftercare with staff


Objective: 





19 13:35


 Vital Signs











Temperature  98.7 F   19 13:22


 


Pulse Rate  88   19 13:22


 


Respiratory Rate  18   19 13:22


 


Blood Pressure  127/81   19 13:22


 


O2 Sat by Pulse Oximetry (%)      








 Laboratory Last Values











WBC  3.7 K/mm3 (4.0-10.0)  L  19  08:00    


 


RBC  4.17 M/mm3 (4.00-5.60)   19  08:00    


 


Hgb  11.6 GM/dL (11.7-16.9)  L  19  08:00    


 


Hct  35.3 % (35.4-49)  L  19  08:00    


 


MCV  84.6 fl (80-96)   19  08:00    


 


MCH  27.8 pg (25.7-33.7)   19  08:00    


 


MCHC  32.8 g/dl (32.0-35.9)   19  08:00    


 


RDW  14.4 % (11.9-15.9)   19  08:00    


 


Plt Count  198 K/MM3 (134-434)   19  08:00    


 


MPV  9.6 fl (7.5-11.1)   19  08:00    


 


Sodium  141 mmol/L (136-145)   19  08:00    


 


Potassium  3.6 mmol/L (3.5-5.1)   19  08:00    


 


Chloride  107 mmol/L ()   19  08:00    


 


Carbon Dioxide  28 mmol/L (21-32)   19  08:00    


 


Anion Gap  6 MMOL/L (8-16)  L  19  08:00    


 


BUN  32.6 mg/dL (7-18)  H  19  08:00    


 


Creatinine  1.8 mg/dL (0.55-1.3)  H  19  08:00    


 


Est GFR (CKD-EPI)AfAm  45.73   19  08:00    


 


Est GFR (CKD-EPI)NonAf  39.46   19  08:00    


 


Random Glucose  105 mg/dL ()   19  08:00    


 


Calcium  8.6 mg/dL (8.5-10.1)   19  08:00    


 


Total Bilirubin  0.3 mg/dL (0.2-1)   19  08:00    


 


AST  35 U/L (15-37)   19  08:00    


 


ALT  39 U/L (13-61)   19  08:00    


 


Alkaline Phosphatase  115 U/L ()   19  08:00    


 


Total Protein  6.3 g/dl (6.4-8.2)  L  19  08:00    


 


Albumin  2.8 g/dl (3.4-5.0)  L  19  08:00    


 


RPR Titer  Nonreactive  (NONREACTIVE)   19  08:00    








lab noted


Assessment: 





19 13:35


alcohol withdrawal sx


Plan: 





continue valium detox regimen

## 2019-11-27 VITALS — TEMPERATURE: 97.6 F | DIASTOLIC BLOOD PRESSURE: 97 MMHG | HEART RATE: 94 BPM | SYSTOLIC BLOOD PRESSURE: 147 MMHG

## 2019-11-27 NOTE — DS
[facility] Detox Discharge Summary


Admission Date: 


19





Discharge Date: 19





- History


Present History: Alcohol Dependence


Additional Comments: 





64 years old male admitted on 19 for alcohol withdrawal sx management 

treated wtih valium detox regimen patient is alert oriented x 3 


cardiac s1s2 regular rate rhythm


respiratory clear lung bilaterally on auscultation


abdomen soft no rebound tenderness 


Pertinent Past History: 





patient prefers return home for personal belonging and return for [facility] 

admission





- Physical Exam Results


Vital Signs: 


 Vital Signs











Temperature  97.6 F   19 09:16


 


Pulse Rate  94 H  19 09:16


 


Respiratory Rate  20   19 09:16


 


Blood Pressure  147/97   19 09:16


 


O2 Sat by Pulse Oximetry (%)      











Pertinent Admission Physical Exam Findings: 





alcohol withdrawal sx


 Laboratory Last Values











WBC  3.7 K/mm3 (4.0-10.0)  L  19  08:00    


 


RBC  4.17 M/mm3 (4.00-5.60)   19  08:00    


 


Hgb  11.6 GM/dL (11.7-16.9)  L  19  08:00    


 


Hct  35.3 % (35.4-49)  L  19  08:00    


 


MCV  84.6 fl (80-96)   19  08:00    


 


MCH  27.8 pg (25.7-33.7)   19  08:00    


 


MCHC  32.8 g/dl (32.0-35.9)   19  08:00    


 


RDW  14.4 % (11.9-15.9)   19  08:00    


 


Plt Count  198 K/MM3 (134-434)   19  08:00    


 


MPV  9.6 fl (7.5-11.1)   19  08:00    


 


Sodium  141 mmol/L (136-145)   19  08:00    


 


Potassium  3.6 mmol/L (3.5-5.1)   19  08:00    


 


Chloride  107 mmol/L ()   19  08:00    


 


Carbon Dioxide  28 mmol/L (21-32)   19  08:00    


 


Anion Gap  6 MMOL/L (8-16)  L  19  08:00    


 


BUN  32.6 mg/dL (7-18)  H  19  08:00    


 


Creatinine  1.8 mg/dL (0.55-1.3)  H  19  08:00    


 


Est GFR (CKD-EPI)AfAm  45.73   19  08:00    


 


Est GFR (CKD-EPI)NonAf  39.46   19  08:00    


 


Random Glucose  105 mg/dL ()   19  08:00    


 


Calcium  8.6 mg/dL (8.5-10.1)   19  08:00    


 


Total Bilirubin  0.3 mg/dL (0.2-1)   19  08:00    


 


AST  35 U/L (15-37)   19  08:00    


 


ALT  39 U/L (13-61)   19  08:00    


 


Alkaline Phosphatase  115 U/L ()   19  08:00    


 


Total Protein  6.3 g/dl (6.4-8.2)  L  19  08:00    


 


Albumin  2.8 g/dl (3.4-5.0)  L  19  08:00    


 


RPR Titer  Nonreactive  (NONREACTIVE)   19  08:00    








lab noted





- Treatment


Hospital Course: Detox Protocol Followed, Detoxed Safely, Responded well, 

Discharged Condition Good, Rehab Referral Accepted


Patient has Accepted a Rehab Referral to: [facility]





- Medication


Discharge Medications: 


Ambulatory Orders





Amlodipine Besylate [Norvasc -] 10 mg PO DAILY #30 tablet 18 


Hydrochlorothiazide 25 mg PO DAILY 19 











- Diagnosis


(1) Alcohol dependence with uncomplicated withdrawal


Current Visit: Yes   Status: Acute   





(2) Nicotine dependence


Current Visit: Yes   Status: Acute   


Qualifiers: 


   Nicotine product type: cigarettes   Substance use status: in withdrawal   

Qualified Code(s): F17.213 - Nicotine dependence, cigarettes, with withdrawal   





(3) Substance induced mood disorder


Current Visit: Yes   Status: Suspected   





(4) Hyperlipidemia


Current Visit: Yes   Status: Chronic   


Qualifiers: 


   Hyperlipidemia type: unspecified   Qualified Code(s): E78.5 - Hyperlipidemia

, unspecified   





(5) Hypertension


Current Visit: Yes   Status: Chronic   


Qualifiers: 


   Hypertension type: essential hypertension   Qualified Code(s): I10 - 

Essential (primary) hypertension   





- AMA


Did Patient Leave Against Medical Advice: No





CIWA Score





- CIWA Score


Nausea/Vomitin-No Nausea/No Vomiting


Muscle Tremors: 1-None Visible, but Felt


Anxiety: 1-Mildly Anxious


Agitation: 1-Slight > Activity


Paroxysmal Sweats: No Perspiration


Orientation: 0-Oriented


Tacttile Disturbances: 0-None


Auditory Disturbances: 0-None


Visual Disturbances: 0-None


Headache: 0-None Present


CIWA-Ar Total Score: 3

## 2020-01-01 ENCOUNTER — HOSPITAL ENCOUNTER (INPATIENT)
Dept: HOSPITAL 74 - YASAS | Age: 65
LOS: 5 days | Discharge: HOME | End: 2020-01-06
Attending: ALLERGY & IMMUNOLOGY | Admitting: ALLERGY & IMMUNOLOGY
Payer: COMMERCIAL

## 2020-01-01 VITALS — BODY MASS INDEX: 23.3 KG/M2

## 2020-01-01 DIAGNOSIS — I10: ICD-10-CM

## 2020-01-01 DIAGNOSIS — E78.5: ICD-10-CM

## 2020-01-01 DIAGNOSIS — I12.9: ICD-10-CM

## 2020-01-01 DIAGNOSIS — G89.29: ICD-10-CM

## 2020-01-01 DIAGNOSIS — R00.0: ICD-10-CM

## 2020-01-01 DIAGNOSIS — N18.3: ICD-10-CM

## 2020-01-01 DIAGNOSIS — Z96.642: ICD-10-CM

## 2020-01-01 DIAGNOSIS — F12.20: ICD-10-CM

## 2020-01-01 DIAGNOSIS — F19.24: ICD-10-CM

## 2020-01-01 DIAGNOSIS — F17.213: ICD-10-CM

## 2020-01-01 DIAGNOSIS — M54.5: ICD-10-CM

## 2020-01-01 DIAGNOSIS — F14.20: ICD-10-CM

## 2020-01-01 DIAGNOSIS — F10.230: Primary | ICD-10-CM

## 2020-01-01 PROCEDURE — HZ2ZZZZ DETOXIFICATION SERVICES FOR SUBSTANCE ABUSE TREATMENT: ICD-10-PCS | Performed by: ALLERGY & IMMUNOLOGY

## 2020-01-01 RX ADMIN — Medication SCH: at 23:28

## 2020-01-01 NOTE — HP
CIWA Score


Nausea/Vomitin-Mild Nausea/No Vomiting


Muscle Tremors: 3


Anxiety: 1-Mildly Anxious


Agitation: 4-Moderately Restless


Paroxysmal Sweats: No Perspiration


Orientation: 0-Oriented


Tacttile Disturbances: 0-None


Auditory Disturbances: 0-None


Visual Disturbances: 0-None


Headache: 3-Moderate


CIWA-Ar Total Score: 12





- Admission Criteria


[facility] Guidelines: Admission for Medically Managed Detox: 


Requires at least one of the followin. CIWA greater than 12


2. Seizures within the past 24 hours


3. Delirium tremens within the past 24 hours


4. Hallucinations within the past 24 hours


5. Acute intervention needed for co  occurring medical disorder


6. Acute intervention needed for co  occurring psychiatric disorder


7. Severe withdrawal that cannot be handled at a lower level of care (continued


    vomiting, continued diarrhea, abnormal vital signs) requiring intravenous


    medication and/or fluids


8. Pregnancy








Admitting History and Physical





- Admission


Chief Complaint: "I want to stop drinking."


History of Present Illness: 


64 years old male admitted on - but prematurely left because he 

stated he had an appointment with a pain management doctor in the city.


He has had multiple detoxes here and has relapsed multiple time.  He even was 

in nursing home for almost one year after a hip replacement.  But as soon as he 

was discharged he relapsed and started drinking again.


He is currently drinking 2 pints of vodka daily plus some beer, last drank at 8:

00AM.  He needs eye openers.  He denies withdrawal seizures.  He has had 

blackout but a few years ago.


He smokes 1ppd smoking since age 18 years old.


CIWA 12


breathalyzer 0.020


PMH:  HTN, Low back pain chronic,


Psurg:  Left hip replacement


Psych:  None


He has family support here in [place] and that is why he comes here, though he 

resides in [place].











History Source: Patient


Limitations to Obtaining History: No Limitations





- Past Medical History


Cardiovascular: Yes: HTN





- Past Surgical History


Additional Past Surgical History: 





left hip replacement





- Smoking History


Smoking history: Current every day smoker


Have you smoked in the past 12 months: Yes


Aproximately how many cigarettes per day: 10





- Alcohol/Substance Use


Hx Alcohol Use: Yes (1 pint vodka daily)


History of Substance Use: reports: Cocaine





- Social History


Usual Living Arrangement: Yes: Alone


Do you think of yourself as: Straight/Heterosexual


ADL: Independent


Occupation: unemployed 


History of Recent Travel: No





Admission ROS S





- HPI


Allergies/Adverse Reactions: 


 Allergies











Allergy/AdvReac Type Severity Reaction Status Date / Time


 


No Known Allergies Allergy   Verified 20 11:38











Exam Limitations: No Limitations





- Ebola screening


Have you traveled outside of the country in the last 21 days: No (N)


Have you had contact with anyone from an Ebola affected area: No


Have you been sick,other than usual withdrawal symptoms: No


Do you have a fever: No





- Review of Systems


Constitutional: Chills, Diaphoresis, Night Sweats, Unintentional Wgt. Loss


EENT: reports: No Symptoms Reported


Respiratory: reports: No Symptoms reported


Cardiac: reports: No Symptoms Reported


GI: reports: No Symptoms Reported


: reports: No Symptoms Reported


Musculoskeletal: reports: No Symptoms Reported


Integumentary: reports: No Symptoms Reported


Neuro: reports: No Symptoms reported


Endocrine: reports: No Symptoms Reported


Hematology: reports: No Symptoms Reported


Psychiatric: reports: No Sypmtoms Reported, Agitated, Anxious, Depressed


Other Systems: Reviewed and Negative





Patient History





- Patient Medical History


Hx Anemia: No


Hx Asthma: No


Hx Chronic Obstructive Pulmonary Disease (COPD): No


Hx Cancer: No


Hx Cardiac Disorders: No


Hx Congestive Heart Failure: No


Hx Hypertension: Yes (ON MEDICATION)


Hx Hypercholesterolemia: Yes (on lipitor)


Hx Pacemaker: No


HX Cerebrovascular Accident: No


Hx Seizures: No


Hx Dementia: No


Hx Diabetes: No


Hx Gastrointestinal Disorders: No


Hx Liver Disease: No


Hx Genitourinary Disorders: No


Hx Sexually Transmitted Disorders: No


Hx Renal Disease (ESRD): No


Hx Thyroid Disease: No


Hx Human Immunodeficiency Virus (HIV): No (last  negative)


Hx Hepatitis C: No


Hx Depression: No


Hx Suicide Attempt: No


Hx Bipolar Disorder: No


Hx Schizophrenia: No





- Patient Surgical History


Past Surgical History: No


Hx Neurologic Surgery: No


Hx Cataract Extraction: No


Hx Cardiac Surgery: No


Hx Lung Surgery: No


Hx Breast Surgery: No


Hx Breast Biopsy: No


Hx Abdominal Surgery: No


Hx Appendectomy: No


Hx Cholecystectomy: No


Hx Genitourinary Surgery: No


Hx  Section: No


Hx Orthopedic Surgery: No


Other Surgical History: left hip replacement


Anesthesia Reaction: No





- PPD History


Previous Implant?: Yes


Documented Results: Negative w/proof


Date: 19


Results: NEGATIVE


PPD to be Administered?: No





- Smoking Cessation


Smoking history: Current every day smoker


Have you smoked in the past 12 months: Yes


Aproximately how many cigarettes per day: 10


Cigars Per Day: 0


Hx Chewing Tobacco Use: No


Initiated information on smoking cessation: Yes


'Breaking Loose' booklet given: 20





- Substances abused


  ** Alcohol


Substance route: Oral


Frequency: Daily


Amount used: 2 PINTS OF VODKA


Age of first use: 19


Date of last use: 20





  ** Cocaine


Substance route: Inhalation


Frequency: Daily


Amount used: 1-2 GRAMS


Age of first use: 22


Date of last use: 20





  ** Marijuana/Hashish


Substance route: Smoking


Frequency: Daily


Amount used: 1 GRAM


Age of first use: 16


Date of last use: 19





Admission Physical Exam [facility]





- Vital Signs


Vital Signs: 


 Vital Signs - 24 hr











  20





  11:41 11:53


 


Temperature 98.0 F 98.0 F


 


Pulse Rate 88 88


 


Respiratory 16 16





Rate  


 


Blood Pressure 178/109 H 178/109 H














- Physical


General Appearance: Yes: Mild Distress, Alcohol on Breath, Tremorous, Irritable

, Sweating, Anxious


HEENTM: Yes: EOMI, Hearing grossly Normal, Normal ENT Inspection, Normocephalic

, Normal Voice, COMFORT, Pharynx Normal, Tm's normal


Respiratory: Yes: Chest Non-Tender, Lungs Clear, Normal Breath Sounds, No 

Respiratory Distress, No Accessory Muscle Use


Neck: Yes: No masses,lesions,Nodules, Supple, Trachea in good position


Breast: Yes: Within Normal Limits


Cardiology: Yes: Regular Rhythm, S1, S2, Tachycardia


Abdominal: Yes: Non Tender, Soft, Increased Bowel Sounds, Protuberent


Genitourinary: Yes: Within Normal Limits


Back: Yes: Normal Inspection


Musculoskeletal: Yes: full range of Motion, Gait Steady, Pelvis Stable


Extremities: Yes: Normal Capillary Refill, Normal Inspection, Normal Range of 

Motion, Non-Tender


Neurological: Yes: CNs II-XII NML intact, Fully Oriented, Alert, Motor Strength 

5/5, Normal Mood/Affect, Normal Response


Integumentary: Yes: Normal Color, Warm


Lymphatic: Yes: Within Normal Limits





- Diagnostic


(1) Alcohol dependence with uncomplicated withdrawal


Current Visit: Yes   Status: Acute   





(2) Cannabis dependence


Current Visit: Yes   Status: Acute   





(3) Cocaine dependence, uncomplicated


Current Visit: Yes   Status: Acute   





(4) Chronic low back pain


Current Visit: Yes   Status: Chronic   


Qualifiers: 


   Back pain laterality: unspecified   Sciatica presence: without sciatica   

Qualified Code(s): M54.5 - Low back pain; G89.29 - Other chronic pain   





(5) Chronic renal insufficiency, stage III (moderate)


Current Visit: Yes   Status: Chronic   





(6) Cocaine dependence


Current Visit: Yes   Status: Chronic   


Qualifiers: 


   Substance use status: uncomplicated   Qualified Code(s): F14.20 - Cocaine 

dependence, uncomplicated   





(7) Hyperlipidemia


Current Visit: Yes   Status: Chronic   


Qualifiers: 


   Hyperlipidemia type: unspecified   Qualified Code(s): E78.5 - Hyperlipidemia

, unspecified   





(8) Hypertension


Current Visit: Yes   Status: Chronic   


Qualifiers: 


   Hypertension type: essential hypertension   Qualified Code(s): I10 - 

Essential (primary) hypertension   





Cleared for Admission [facility]





- Detox or Rehab


[facility] Level of Care: Medically Managed


Detox Regimen/Protocol: Librium


Claeared for Rehab Admission: No





Screened but not Admitted





- Documentation of Visit


Screened but not Admitted: No





Breathalyzer





- Breathalyzer


Breathalyzer: 0.020





Urine Drug Screen





- Test Device


Lot number: [id number]


Expiration date: 21





- Control


Is test valid?: Yes





- Results


Drug screen NEGATIVE: No


Urine drug screen results: THC-Marijuana, KAYLYNN-Cocaine, BZO-Benzodiazepines





Inpatient Rehab Admission





- Rehab Decision to Admit


Inpatient rehab admission?: No

## 2020-01-02 LAB
ALBUMIN SERPL-MCNC: 2.8 G/DL (ref 3.4–5)
ALP SERPL-CCNC: 119 U/L (ref 45–117)
ALT SERPL-CCNC: 36 U/L (ref 13–61)
ANION GAP SERPL CALC-SCNC: 6 MMOL/L (ref 8–16)
AST SERPL-CCNC: 30 U/L (ref 15–37)
BILIRUB SERPL-MCNC: 0.1 MG/DL (ref 0.2–1)
BUN SERPL-MCNC: 32.5 MG/DL (ref 7–18)
CALCIUM SERPL-MCNC: 8.5 MG/DL (ref 8.5–10.1)
CHLORIDE SERPL-SCNC: 110 MMOL/L (ref 98–107)
CO2 SERPL-SCNC: 28 MMOL/L (ref 21–32)
CREAT SERPL-MCNC: 1.5 MG/DL (ref 0.55–1.3)
DEPRECATED RDW RBC AUTO: 15.1 % (ref 11.9–15.9)
GLUCOSE SERPL-MCNC: 92 MG/DL (ref 74–106)
HCT VFR BLD CALC: 35.9 % (ref 35.4–49)
HGB BLD-MCNC: 11.7 GM/DL (ref 11.7–16.9)
MCH RBC QN AUTO: 27.3 PG (ref 25.7–33.7)
MCHC RBC AUTO-ENTMCNC: 32.5 G/DL (ref 32–35.9)
MCV RBC: 84 FL (ref 80–96)
PLATELET # BLD AUTO: 195 K/MM3 (ref 134–434)
PMV BLD: 9.5 FL (ref 7.5–11.1)
POTASSIUM SERPLBLD-SCNC: 4.2 MMOL/L (ref 3.5–5.1)
PROT SERPL-MCNC: 6.3 G/DL (ref 6.4–8.2)
RBC # BLD AUTO: 4.28 M/MM3 (ref 4–5.6)
SODIUM SERPL-SCNC: 143 MMOL/L (ref 136–145)
WBC # BLD AUTO: 4.4 K/MM3 (ref 4–10)

## 2020-01-02 RX ADMIN — CARVEDILOL SCH: 3.12 TABLET, FILM COATED ORAL at 22:57

## 2020-01-02 RX ADMIN — NICOTINE SCH: 14 PATCH, EXTENDED RELEASE TRANSDERMAL at 11:00

## 2020-01-02 RX ADMIN — ISOSORBIDE MONONITRATE SCH MG: 30 TABLET, EXTENDED RELEASE ORAL at 11:30

## 2020-01-02 RX ADMIN — CARVEDILOL SCH MG: 3.12 TABLET, FILM COATED ORAL at 11:30

## 2020-01-02 RX ADMIN — HYDROCHLOROTHIAZIDE SCH MG: 25 TABLET ORAL at 10:21

## 2020-01-02 RX ADMIN — Medication SCH TAB: at 10:21

## 2020-01-02 RX ADMIN — ASPIRIN 81 MG SCH MG: 81 TABLET ORAL at 11:30

## 2020-01-02 RX ADMIN — AMLODIPINE BESYLATE SCH MG: 10 TABLET ORAL at 10:21

## 2020-01-02 RX ADMIN — Medication SCH: at 22:57

## 2020-01-02 NOTE — EKG
Test Reason : 

Blood Pressure : ***/*** mmHG

Vent. Rate : 075 BPM     Atrial Rate : 075 BPM

   P-R Int : 152 ms          QRS Dur : 092 ms

    QT Int : 446 ms       P-R-T Axes : 058 -35 -33 degrees

   QTc Int : 498 ms

 

*** POOR DATA QUALITY, INTERPRETATION MAY BE ADVERSELY AFFECTED

NORMAL SINUS RHYTHM

POSSIBLE LEFT ATRIAL ENLARGEMENT

LEFT AXIS DEVIATION

LEFT VENTRICULAR HYPERTROPHY

CANNOT RULE OUT SEPTAL INFARCT (CITED ON OR BEFORE 06-NOV-2017)

T WAVE ABNORMALITY, CONSIDER INFERIOR ISCHEMIA

ABNORMAL ECG

WHEN COMPARED WITH ECG OF 29-JUN-2018 12:22,

QUESTIONABLE CHANGE IN INITIAL FORCES OF SEPTAL LEADS

Confirmed by MODE HESTER MD ([id number]) on 1/2/2020 3:14:50 PM

 

Referred By:  SHANE           Confirmed By:MODE HESTER MD

## 2020-01-02 NOTE — PN
S CIWA





- CIWA Score


Nausea/Vomitin-Mild Nausea/No Vomiting


Muscle Tremors: 3


Anxiety: 3


Agitation: 1-Slight > Activity


Paroxysmal Sweats: 2


Orientation: 0-Oriented


Tacttile Disturbances: 0-None


Auditory Disturbances: 0-None


Visual Disturbances: 0-None


Headache: 1-Very Mild


CIWA-Ar Total Score: 11





[facility] Progress Note (SOAP)


Subjective: 





64 years old male admitted on 20 for alcohol withdrawal sx management


treating with librium detox regimen


ate breakfast tolerated food well feeling tired resting in bed


patient has long history of hypertension treated with amlodipine and hctz


Objective: 





20 10:25


 Vital Signs











Temperature  97.8 F   20 09:12


 


Pulse Rate  84   20 09:12


 


Respiratory Rate  16   20 09:12


 


Blood Pressure  157/95   20 09:12


 


O2 Sat by Pulse Oximetry (%)      








 Laboratory Last Values











WBC  4.4 K/mm3 (4.0-10.0)   20  08:00    


 


RBC  4.28 M/mm3 (4.00-5.60)   20  08:00    


 


Hgb  11.7 GM/dL (11.7-16.9)   20  08:00    


 


Hct  35.9 % (35.4-49)   20  08:00    


 


MCV  84.0 fl (80-96)   20  08:00    


 


MCH  27.3 pg (25.7-33.7)   20  08:00    


 


MCHC  32.5 g/dl (32.0-35.9)   20  08:00    


 


RDW  15.1 % (11.9-15.9)   20  08:00    


 


Plt Count  195 K/MM3 (134-434)   20  08:00    


 


MPV  9.5 fl (7.5-11.1)   20  08:00    








lab noted


bp elevation


adds isosorb mono 30mg and carvedilol 3.125 mg po bid 


Assessment: 





20 10:26


alcohol withdrawal


Plan: 





librium detox regimen

## 2020-01-03 RX ADMIN — Medication SCH: at 22:51

## 2020-01-03 RX ADMIN — NICOTINE SCH: 14 PATCH, EXTENDED RELEASE TRANSDERMAL at 09:28

## 2020-01-03 RX ADMIN — ISOSORBIDE MONONITRATE SCH MG: 30 TABLET, EXTENDED RELEASE ORAL at 09:26

## 2020-01-03 RX ADMIN — CARVEDILOL SCH MG: 3.12 TABLET, FILM COATED ORAL at 09:27

## 2020-01-03 RX ADMIN — CARVEDILOL SCH: 3.12 TABLET, FILM COATED ORAL at 22:50

## 2020-01-03 RX ADMIN — Medication SCH TAB: at 09:27

## 2020-01-03 RX ADMIN — ASPIRIN 81 MG SCH MG: 81 TABLET ORAL at 09:27

## 2020-01-03 RX ADMIN — HYDROCHLOROTHIAZIDE SCH MG: 25 TABLET ORAL at 09:26

## 2020-01-03 RX ADMIN — AMLODIPINE BESYLATE SCH MG: 10 TABLET ORAL at 09:26

## 2020-01-03 NOTE — PN
S CIWA





- CIWA Score


Nausea/Vomitin-No Nausea/No Vomiting


Muscle Tremors: None


Anxiety: 3


Agitation: 0-Normal Activity


Paroxysmal Sweats: 3


Orientation: 0-Oriented


Tacttile Disturbances: 0-None


Auditory Disturbances: 0-None


Visual Disturbances: 0-None


Headache: 2-Mild


CIWA-Ar Total Score: 8





[facility] Progress Note (SOAP)


Subjective: 





c/o sweats, anxiety, and headache.


Objective: 





20 10:16


 Vital Signs











  20





  03:30 06:29 09:16


 


Temperature  97.4 F L 98.2 F


 


Pulse Rate  75 80


 


Respiratory 18 18 18





Rate   


 


Blood Pressure  146/93 146/90








 Laboratory Last Values











WBC  4.4 K/mm3 (4.0-10.0)   20  08:00    


 


RBC  4.28 M/mm3 (4.00-5.60)   20  08:00    


 


Hgb  11.7 GM/dL (11.7-16.9)   20  08:00    


 


Hct  35.9 % (35.4-49)   20  08:00    


 


MCV  84.0 fl (80-96)   20  08:00    


 


MCH  27.3 pg (25.7-33.7)   20  08:00    


 


MCHC  32.5 g/dl (32.0-35.9)   20  08:00    


 


RDW  15.1 % (11.9-15.9)   20  08:00    


 


Plt Count  195 K/MM3 (134-434)   20  08:00    


 


MPV  9.5 fl (7.5-11.1)   20  08:00    


 


Sodium  143 mmol/L (136-145)   20  08:00    


 


Potassium  4.2 mmol/L (3.5-5.1)   20  08:00    


 


Chloride  110 mmol/L ()  H  20  08:00    


 


Carbon Dioxide  28 mmol/L (21-32)   20  08:00    


 


Anion Gap  6 MMOL/L (8-16)  L  20  08:00    


 


BUN  32.5 mg/dL (7-18)  H  20  08:00    


 


Creatinine  1.5 mg/dL (0.55-1.3)  H  20  08:00    


 


Est GFR (CKD-EPI)AfAm  57.01   20  08:00    


 


Est GFR (CKD-EPI)NonAf  49.19   20  08:00    


 


Random Glucose  92 mg/dL ()   20  08:00    


 


Calcium  8.5 mg/dL (8.5-10.1)   20  08:00    


 


Total Bilirubin  0.1 mg/dL (0.2-1)  L  20  08:00    


 


AST  30 U/L (15-37)   20  08:00    


 


ALT  36 U/L (13-61)   20  08:00    


 


Alkaline Phosphatase  119 U/L ()  H  20  08:00    


 


Total Protein  6.3 g/dl (6.4-8.2)  L  20  08:00    


 


Albumin  2.8 g/dl (3.4-5.0)  L  20  08:00    


 


RPR Titer  Nonreactive  (NONREACTIVE)   20  08:00    








Labs noted


Assessment: 





20 10:16


AOX3, in no acute respiratory distress.


Full ROM, ambulating in the unit.


Withdrawal symptoms.


Plan: 





continue detox.

## 2020-01-04 RX ADMIN — CARVEDILOL SCH MG: 3.12 TABLET, FILM COATED ORAL at 10:42

## 2020-01-04 RX ADMIN — ASPIRIN 81 MG SCH MG: 81 TABLET ORAL at 10:42

## 2020-01-04 RX ADMIN — CARVEDILOL SCH: 3.12 TABLET, FILM COATED ORAL at 22:46

## 2020-01-04 RX ADMIN — AMLODIPINE BESYLATE SCH MG: 10 TABLET ORAL at 10:42

## 2020-01-04 RX ADMIN — HYDROCHLOROTHIAZIDE SCH MG: 25 TABLET ORAL at 10:42

## 2020-01-04 RX ADMIN — NICOTINE SCH: 14 PATCH, EXTENDED RELEASE TRANSDERMAL at 10:42

## 2020-01-04 RX ADMIN — ISOSORBIDE MONONITRATE SCH MG: 30 TABLET, EXTENDED RELEASE ORAL at 10:42

## 2020-01-04 RX ADMIN — Medication SCH: at 22:45

## 2020-01-04 RX ADMIN — Medication SCH TAB: at 10:42

## 2020-01-04 NOTE — PN
S CIWA





- CIWA Score


Nausea/Vomitin-No Nausea/No Vomiting


Muscle Tremors: 1-None Visible, but Felt


Anxiety: 3


Agitation: 0-Normal Activity


Paroxysmal Sweats: 3


Orientation: 0-Oriented


Tacttile Disturbances: 0-None


Auditory Disturbances: 0-None


Visual Disturbances: 0-None


Headache: 0-None Present


CIWA-Ar Total Score: 7





[facility] Progress Note (SOAP)


Subjective: 





c/o sweats, anxiety, and mild shakes.


Objective: 





20 11:21


 Vital Signs











  20





  03:30 06:13 09:11


 


Temperature  97.7 F 97.5 F L


 


Pulse Rate  79 74


 


Respiratory 18 18 18





Rate   


 


Blood Pressure  150/89 168/108 H








 Lab Results











WBC  4.4 K/mm3 (4.0-10.0)   20  08:00    


 


RBC  4.28 M/mm3 (4.00-5.60)   20  08:00    


 


Hgb  11.7 GM/dL (11.7-16.9)   20  08:00    


 


Hct  35.9 % (35.4-49)   20  08:00    


 


MCV  84.0 fl (80-96)   20  08:00    


 


MCHC  32.5 g/dl (32.0-35.9)   20  08:00    


 


RDW  15.1 % (11.9-15.9)   20  08:00    


 


Plt Count  195 K/MM3 (134-434)   20  08:00    


 


Sodium  143 mmol/L (136-145)   20  08:00    


 


Potassium  4.2 mmol/L (3.5-5.1)   20  08:00    


 


Chloride  110 mmol/L ()  H  20  08:00    


 


Carbon Dioxide  28 mmol/L (21-32)   20  08:00    


 


Anion Gap  6 MMOL/L (8-16)  L  20  08:00    


 


BUN  32.5 mg/dL (7-18)  H  20  08:00    


 


Creatinine  1.5 mg/dL (0.55-1.3)  H  20  08:00    


 


Random Glucose  92 mg/dL ()   20  08:00    


 


Calcium  8.5 mg/dL (8.5-10.1)   20  08:00    








Labs noted.


Assessment: 





20 11:22


AOX3, in no acute respiratory distress.


Full ROM, ambulating in the unit.


Withdrawal symptoms.


Plan: 





continue detox.


Increase fluids.

## 2020-01-05 RX ADMIN — CARVEDILOL SCH MG: 3.12 TABLET, FILM COATED ORAL at 22:23

## 2020-01-05 RX ADMIN — ISOSORBIDE MONONITRATE SCH MG: 30 TABLET, EXTENDED RELEASE ORAL at 09:57

## 2020-01-05 RX ADMIN — Medication SCH TAB: at 09:57

## 2020-01-05 RX ADMIN — HYDROCHLOROTHIAZIDE SCH MG: 25 TABLET ORAL at 09:57

## 2020-01-05 RX ADMIN — CARVEDILOL SCH MG: 3.12 TABLET, FILM COATED ORAL at 09:57

## 2020-01-05 RX ADMIN — ASPIRIN 81 MG SCH MG: 81 TABLET ORAL at 09:57

## 2020-01-05 RX ADMIN — Medication SCH MG: at 22:24

## 2020-01-05 RX ADMIN — AMLODIPINE BESYLATE SCH MG: 10 TABLET ORAL at 09:57

## 2020-01-05 RX ADMIN — NICOTINE SCH: 14 PATCH, EXTENDED RELEASE TRANSDERMAL at 09:57

## 2020-01-05 NOTE — PN
S CIWA





- CIWA Score


Nausea/Vomitin-No Nausea/No Vomiting


Muscle Tremors: 1-None Visible, but Felt


Anxiety: 2


Agitation: 1-Slight > Activity


Paroxysmal Sweats: No Perspiration


Orientation: 0-Oriented


Tacttile Disturbances: 0-None


Auditory Disturbances: 0-None


Visual Disturbances: 0-None


Headache: 0-None Present


CIWA-Ar Total Score: 4





[facility] Progress Note (SOAP)


Subjective: 





64 years old male admitted on 20 for alcohol withdrawal sx management


treating with librium detox regiment 


feeling better today less tremor mild anxiety


long history of bp elevation monitoring by [facility] primary 

care provider 


patient agrees to return to primary care for antihypertensant dosage adjustment


Objective: 





20 10:26


 Vital Signs











Temperature  98.8 F   20 09:17


 


Pulse Rate  73   20 09:17


 


Respiratory Rate  16   20 09:17


 


Blood Pressure  139/90   20 09:17


 


O2 Sat by Pulse Oximetry (%)      








 Laboratory Last Values











WBC  4.4 K/mm3 (4.0-10.0)   20  08:00    


 


RBC  4.28 M/mm3 (4.00-5.60)   20  08:00    


 


Hgb  11.7 GM/dL (11.7-16.9)   20  08:00    


 


Hct  35.9 % (35.4-49)   20  08:00    


 


MCV  84.0 fl (80-96)   20  08:00    


 


MCH  27.3 pg (25.7-33.7)   20  08:00    


 


MCHC  32.5 g/dl (32.0-35.9)   20  08:00    


 


RDW  15.1 % (11.9-15.9)   20  08:00    


 


Plt Count  195 K/MM3 (134-434)   20  08:00    


 


MPV  9.5 fl (7.5-11.1)   20  08:00    


 


Sodium  143 mmol/L (136-145)   20  08:00    


 


Potassium  4.2 mmol/L (3.5-5.1)   20  08:00    


 


Chloride  110 mmol/L ()  H  20  08:00    


 


Carbon Dioxide  28 mmol/L (21-32)   20  08:00    


 


Anion Gap  6 MMOL/L (8-16)  L  20  08:00    


 


BUN  32.5 mg/dL (7-18)  H  20  08:00    


 


Creatinine  1.5 mg/dL (0.55-1.3)  H  20  08:00    


 


Est GFR (CKD-EPI)AfAm  57.01   20  08:00    


 


Est GFR (CKD-EPI)NonAf  49.19   20  08:00    


 


Random Glucose  92 mg/dL ()   20  08:00    


 


Calcium  8.5 mg/dL (8.5-10.1)   20  08:00    


 


Total Bilirubin  0.1 mg/dL (0.2-1)  L  20  08:00    


 


AST  30 U/L (15-37)   20  08:00    


 


ALT  36 U/L (13-61)   20  08:00    


 


Alkaline Phosphatase  119 U/L ()  H  20  08:00    


 


Total Protein  6.3 g/dl (6.4-8.2)  L  20  08:00    


 


Albumin  2.8 g/dl (3.4-5.0)  L  20  08:00    


 


RPR Titer  Nonreactive  (NONREACTIVE)   20  08:00    








lab noted


Assessment: 





20 10:27


alcohol withdrawal 


Plan: 





librium regimen

## 2020-01-06 VITALS — SYSTOLIC BLOOD PRESSURE: 147 MMHG | TEMPERATURE: 98.5 F | DIASTOLIC BLOOD PRESSURE: 99 MMHG | HEART RATE: 96 BPM

## 2020-01-06 RX ADMIN — NICOTINE SCH: 14 PATCH, EXTENDED RELEASE TRANSDERMAL at 09:11

## 2020-01-06 RX ADMIN — AMLODIPINE BESYLATE SCH: 10 TABLET ORAL at 09:11

## 2020-01-06 RX ADMIN — ISOSORBIDE MONONITRATE SCH: 30 TABLET, EXTENDED RELEASE ORAL at 09:11

## 2020-01-06 RX ADMIN — Medication SCH: at 09:11

## 2020-01-06 RX ADMIN — CARVEDILOL SCH: 3.12 TABLET, FILM COATED ORAL at 09:11

## 2020-01-06 RX ADMIN — ASPIRIN 81 MG SCH: 81 TABLET ORAL at 09:11

## 2020-01-06 RX ADMIN — HYDROCHLOROTHIAZIDE SCH: 25 TABLET ORAL at 09:11

## 2020-01-06 NOTE — DS
[facility] Detox Discharge Summary


Admission Date: 


20





Discharge Date: 20





- History


Present History: Alcohol Dependence


Additional Comments: 





64 years old male admitted on 20 for alcohol withdrawal sx management


treated with librium detox regimen


patient tolerated well alert oriented x 3


cardiac s1s2 regular rate rhythm


respiratory clear lungs bilaterally on auscultation


skin warm and dry





- Physical Exam Results


Vital Signs: 


 Vital Signs











Temperature  98.5 F   20 09:09


 


Pulse Rate  96 H  20 09:09


 


Respiratory Rate  20   20 09:09


 


Blood Pressure  147/99   20 09:09


 


O2 Sat by Pulse Oximetry (%)      











Pertinent Admission Physical Exam Findings: 





alcohol withdrawal


 Laboratory Last Values











WBC  4.4 K/mm3 (4.0-10.0)   20  08:00    


 


RBC  4.28 M/mm3 (4.00-5.60)   20  08:00    


 


Hgb  11.7 GM/dL (11.7-16.9)   20  08:00    


 


Hct  35.9 % (35.4-49)   20  08:00    


 


MCV  84.0 fl (80-96)   20  08:00    


 


MCH  27.3 pg (25.7-33.7)   20  08:00    


 


MCHC  32.5 g/dl (32.0-35.9)   20  08:00    


 


RDW  15.1 % (11.9-15.9)   20  08:00    


 


Plt Count  195 K/MM3 (134-434)   20  08:00    


 


MPV  9.5 fl (7.5-11.1)   20  08:00    


 


Sodium  143 mmol/L (136-145)   20  08:00    


 


Potassium  4.2 mmol/L (3.5-5.1)   20  08:00    


 


Chloride  110 mmol/L ()  H  20  08:00    


 


Carbon Dioxide  28 mmol/L (21-32)   20  08:00    


 


Anion Gap  6 MMOL/L (8-16)  L  20  08:00    


 


BUN  32.5 mg/dL (7-18)  H  20  08:00    


 


Creatinine  1.5 mg/dL (0.55-1.3)  H  20  08:00    


 


Est GFR (CKD-EPI)AfAm  57.01   20  08:00    


 


Est GFR (CKD-EPI)NonAf  49.19   20  08:00    


 


Random Glucose  92 mg/dL ()   20  08:00    


 


Calcium  8.5 mg/dL (8.5-10.1)   20  08:00    


 


Total Bilirubin  0.1 mg/dL (0.2-1)  L  20  08:00    


 


AST  30 U/L (15-37)   20  08:00    


 


ALT  36 U/L (13-61)   20  08:00    


 


Alkaline Phosphatase  119 U/L ()  H  20  08:00    


 


Total Protein  6.3 g/dl (6.4-8.2)  L  20  08:00    


 


Albumin  2.8 g/dl (3.4-5.0)  L  20  08:00    


 


RPR Titer  Nonreactive  (NONREACTIVE)   20  08:00    








lab noted





- Treatment


Hospital Course: Detox Protocol Followed, Detoxed Safely, Responded well, 

Discharged Condition Good, Rehab Referral Accepted


Patient has Accepted a Rehab Referral to: [facility]





- Medication


Discharge Medications: 


Ambulatory Orders





Amlodipine Besylate [Norvasc -] 10 mg PO DAILY #30 tablet 18 


Hydrochlorothiazide 25 mg PO DAILY 19 











- Diagnosis


(1) Alcohol dependence with uncomplicated withdrawal


Status: Acute   





(2) Nicotine dependence


Status: Acute   


Qualifiers: 


   Nicotine product type: cigarettes   Substance use status: in withdrawal   

Qualified Code(s): F17.213 - Nicotine dependence, cigarettes, with withdrawal   





(3) Hyperlipidemia


Status: Chronic   


Qualifiers: 


   Hyperlipidemia type: unspecified   Qualified Code(s): E78.5 - Hyperlipidemia

, unspecified   





(4) Hypertension


Status: Chronic   


Qualifiers: 


   Hypertension type: essential hypertension   Qualified Code(s): I10 - 

Essential (primary) hypertension   





(5) Substance induced mood disorder


Status: Suspected   





- AMA


Did Patient Leave Against Medical Advice: No





CIWA Score





- CIWA Score


Nausea/Vomitin-No Nausea/No Vomiting


Muscle Tremors: 1-None Visible, but Felt


Anxiety: 0-No Anxiety, at Ease


Agitation: 0-Normal Activity


Paroxysmal Sweats: No Perspiration


Orientation: 0-Oriented


Tacttile Disturbances: 0-None


Auditory Disturbances: 0-None


Visual Disturbances: 0-None


Headache: 0-None Present


CIWA-Ar Total Score: 1

## 2020-02-26 ENCOUNTER — HOSPITAL ENCOUNTER (INPATIENT)
Dept: HOSPITAL 74 - YASAS | Age: 65
LOS: 5 days | Discharge: HOME | End: 2020-03-02
Attending: ALLERGY & IMMUNOLOGY | Admitting: ALLERGY & IMMUNOLOGY
Payer: COMMERCIAL

## 2020-02-26 VITALS — BODY MASS INDEX: 21.1 KG/M2

## 2020-02-26 DIAGNOSIS — Z56.0: ICD-10-CM

## 2020-02-26 DIAGNOSIS — F14.20: ICD-10-CM

## 2020-02-26 DIAGNOSIS — F17.210: ICD-10-CM

## 2020-02-26 DIAGNOSIS — F13.230: ICD-10-CM

## 2020-02-26 DIAGNOSIS — F12.20: ICD-10-CM

## 2020-02-26 DIAGNOSIS — E78.1: ICD-10-CM

## 2020-02-26 DIAGNOSIS — F10.230: Primary | ICD-10-CM

## 2020-02-26 DIAGNOSIS — Z96.642: ICD-10-CM

## 2020-02-26 DIAGNOSIS — N18.3: ICD-10-CM

## 2020-02-26 DIAGNOSIS — M54.5: ICD-10-CM

## 2020-02-26 DIAGNOSIS — F19.24: ICD-10-CM

## 2020-02-26 DIAGNOSIS — G89.29: ICD-10-CM

## 2020-02-26 DIAGNOSIS — I12.9: ICD-10-CM

## 2020-02-26 DIAGNOSIS — E78.5: ICD-10-CM

## 2020-02-26 LAB
ALBUMIN SERPL-MCNC: 3.8 G/DL (ref 3.4–5)
ALP SERPL-CCNC: 83 U/L (ref 45–117)
ALT SERPL-CCNC: 51 U/L (ref 13–61)
ANION GAP SERPL CALC-SCNC: 4 MMOL/L (ref 8–16)
AST SERPL-CCNC: 51 U/L (ref 15–37)
BILIRUB SERPL-MCNC: 0.6 MG/DL (ref 0.2–1)
BUN SERPL-MCNC: 25.5 MG/DL (ref 7–18)
CALCIUM SERPL-MCNC: 9 MG/DL (ref 8.5–10.1)
CHLORIDE SERPL-SCNC: 106 MMOL/L (ref 98–107)
CO2 SERPL-SCNC: 29 MMOL/L (ref 21–32)
CREAT SERPL-MCNC: 2 MG/DL (ref 0.55–1.3)
DEPRECATED RDW RBC AUTO: 14.2 % (ref 11.9–15.9)
GLUCOSE SERPL-MCNC: 104 MG/DL (ref 74–106)
HCT VFR BLD CALC: 40.4 % (ref 35.4–49)
HGB BLD-MCNC: 13.1 GM/DL (ref 11.7–16.9)
MCH RBC QN AUTO: 27.7 PG (ref 25.7–33.7)
MCHC RBC AUTO-ENTMCNC: 32.4 G/DL (ref 32–35.9)
MCV RBC: 85.4 FL (ref 80–96)
PLATELET # BLD AUTO: 244 K/MM3 (ref 134–434)
PMV BLD: 9.7 FL (ref 7.5–11.1)
POTASSIUM SERPLBLD-SCNC: 4 MMOL/L (ref 3.5–5.1)
PROT SERPL-MCNC: 8.5 G/DL (ref 6.4–8.2)
RBC # BLD AUTO: 4.73 M/MM3 (ref 4–5.6)
SODIUM SERPL-SCNC: 139 MMOL/L (ref 136–145)
WBC # BLD AUTO: 5.2 K/MM3 (ref 4–10)

## 2020-02-26 PROCEDURE — HZ2ZZZZ DETOXIFICATION SERVICES FOR SUBSTANCE ABUSE TREATMENT: ICD-10-PCS | Performed by: ALLERGY & IMMUNOLOGY

## 2020-02-26 RX ADMIN — AMLODIPINE BESYLATE SCH MG: 10 TABLET ORAL at 18:15

## 2020-02-26 RX ADMIN — NIFEDIPINE SCH MG: 90 TABLET, FILM COATED, EXTENDED RELEASE ORAL at 18:16

## 2020-02-26 RX ADMIN — Medication SCH MG: at 22:38

## 2020-02-26 RX ADMIN — HYDROCHLOROTHIAZIDE SCH: 25 TABLET ORAL at 18:15

## 2020-02-26 RX ADMIN — LISINOPRIL SCH MG: 10 TABLET ORAL at 18:15

## 2020-02-26 RX ADMIN — ATORVASTATIN CALCIUM SCH MG: 20 TABLET, FILM COATED ORAL at 22:38

## 2020-02-26 SDOH — ECONOMIC STABILITY - INCOME SECURITY: UNEMPLOYMENT, UNSPECIFIED: Z56.0

## 2020-02-26 NOTE — B[facility].RME
Substance Use & Tx History





- Substance Use History


  ** Alcohol


Substance amount: 2 pints vodka


Frequency of use: Daily


Substance route: Oral


Date of Last Use: 20





  ** Cocaine (Powder)


Substance amount: 0.5 -1 gram


Frequency of use: Daily


Substance route: Inhalation (ex: sniffing or snorting)


Date of Last Use: 20





  ** Nicotine


Substance amount: 1pack


Frequency of use: Daily


Substance route: Smoking


Date of Last Use: 20





CIWA


Nausea/Vomitin


Muscle Tremors: 3


Anxiety: 2


Agitation: 2


Paroxysmal Sweats: 2


Orientation: 0-Oriented


Tacttile Disturbances: 0-None


Auditory Disturbances: 0-None


Visual Disturbances: 1-Very Mild Sensitivity


Headache: 2-Mild


CIWA-Ar Total Score: 14

## 2020-02-26 NOTE — PN
[facility] Progress Note


Note: 





received nurse call 


bp elevation


chart review


history of hypertension 


treated with antihypertensive medication 


review home medications


begin home medications

## 2020-02-26 NOTE — HP
CIWA Score


Nausea/Vomitin


Muscle Tremors: 3


Anxiety: 2


Agitation: 2


Paroxysmal Sweats: 2


Orientation: 0-Oriented


Tacttile Disturbances: 0-None


Auditory Disturbances: 0-None


Visual Disturbances: 1-Very Mild Sensitivity


Headache: 2-Mild


CIWA-Ar Total Score: 14





- Admission Criteria


[facility] Guidelines: Admission for Medically Managed Detox: 


Requires at least one of the followin. CIWA greater than 12


2. Seizures within the past 24 hours


3. Delirium tremens within the past 24 hours


4. Hallucinations within the past 24 hours


5. Acute intervention needed for co  occurring medical disorder


6. Acute intervention needed for co  occurring psychiatric disorder


7. Severe withdrawal that cannot be handled at a lower level of care (continued


    vomiting, continued diarrhea, abnormal vital signs) requiring intravenous


    medication and/or fluids


8. Pregnancy








Admitting History and Physical





- Admission


Chief Complaint: " I want to at least try to break the habit."


History of Present Illness: 





64 year old male with history of alcohol dependence with withdrawal.  He was 

here in -19 AMA, then -20 and completed detox but didn't want 

to go to rehab and then relapsed 2 weeks later.  He was last admitted last 

friday for a hypertensive episode at [facility].  He had blackouts in 

the past but not recently





Alcohol:  2 pints vodka and beers last drank at 3:00AM.


Cocaine:  0.5-1 gram daily, last used this morning intranasally


Nictone:  1 PPD last smoked today and started at age 18 years old





PMH:  HTN, Chronic low back pain


Psurg:  L hip replacement


Psych:  None


He is seeking detox once again and will proceed to rehab afterwards.


He has poor support systems and is homeless.


He has poor judgment and needs to gain insight into his disease.


History Source: Patient





- Past Medical History


Cardiovascular: Yes: HTN


Musculoskeletal: Yes: Other (low back pain chronic)





- Past Surgical History


Past Surgical History: Yes: Joint Replacement (Left Hip replacement)





- Smoking History


Smoking history: Current every day smoker


Have you smoked in the past 12 months: Yes


Aproximately how many cigarettes per day: 10





- Alcohol/Substance Use


Hx Alcohol Use: Yes (1 pint vodka daily)


History of Substance Use: reports: Cocaine





- Social History


ADL: Independent


Occupation: unemployed 


History of Recent Travel: No





Admission ROS [facility]





- [facility]


Allergies/Adverse Reactions: 


 Allergies











Allergy/AdvReac Type Severity Reaction Status Date / Time


 


No Known Allergies Allergy   Verified 20 11:38











Exam Limitations: No Limitations





- Ebola screening


Have you traveled outside of the country in the last 21 days: No


Have you had contact with anyone from an Ebola affected area: No


Have you been sick,other than usual withdrawal symptoms: No


Do you have a fever: No





- Review of Systems


Constitutional: No Symptoms Reported


EENT: reports: No Symptoms Reported


Respiratory: reports: No Symptoms reported


Cardiac: reports: No Symptoms Reported


GI: reports: No Symptoms Reported


: reports: No Symptoms Reported


Musculoskeletal: reports: No Symptoms Reported


Integumentary: reports: No Symptoms Reported


Neuro: reports: No Symptoms reported


Endocrine: reports: No Symptoms Reported


Hematology: reports: No Symptoms Reported


Psychiatric: reports: Judgement Intact, Mood/Affect Appropiate, Orientated x3


Other Systems: Reviewed and Negative





Patient History





- Patient Medical History


Hx Anemia: No


Hx Asthma: No


Hx Chronic Obstructive Pulmonary Disease (COPD): No


Hx Cancer: No


Hx Cardiac Disorders: No


Hx Congestive Heart Failure: No


Hx Hypertension: Yes (on meds)


Hx Hypercholesterolemia: Yes (on lipitor)


Hx Pacemaker: No


HX Cerebrovascular Accident: No


Hx Seizures: No


Hx Dementia: No


Hx Diabetes: No


Hx Gastrointestinal Disorders: No


Hx Liver Disease: No


Hx Genitourinary Disorders: No


Hx Sexually Transmitted Disorders: No


Hx Renal Disease (ESRD): No


Hx Thyroid Disease: No


Hx Human Immunodeficiency Virus (HIV): No (last  negative)


Hx Hepatitis C: No


Hx Depression: No


Hx Suicide Attempt: No


Hx Bipolar Disorder: No


Hx Schizophrenia: No





- Patient Surgical History


Past Surgical History: No


Hx Neurologic Surgery: No


Hx Cataract Extraction: No


Hx Cardiac Surgery: No


Hx Lung Surgery: No


Hx Breast Surgery: No


Hx Breast Biopsy: No


Hx Abdominal Surgery: No


Hx Appendectomy: No


Hx Cholecystectomy: No


Hx Genitourinary Surgery: No


Hx  Section: No


Hx Orthopedic Surgery: No


Other Surgical History: left hip replacement


Anesthesia Reaction: No





- PPD History


Previous Implant?: Yes


Documented Results: Negative w/proof


Implanted On Prior R Admission?: Yes


Date: 19


Results: NEGATIVE


PPD to be Administered?: No





- Smoking Cessation


Smoking history: Current every day smoker


Have you smoked in the past 12 months: Yes


Aproximately how many cigarettes per day: 10


Cigars Per Day: 0


Hx Chewing Tobacco Use: No


Initiated information on smoking cessation: Yes


'Breaking Loose' booklet given: 20





- Substances abused


  ** Alprazolam (Xanax)


Substance route: Oral


Amount used: 2 pints vodka


Age of first use: 17


Date of last use: 20 (3 am)





  ** Cocaine


Substance route: Inhalation


Amount used: 0.5-1 gram


Age of first use: 22


Date of last use: 20





Admission Physical Exam [facility]





- Physical


General Appearance: Yes: Mild Distress, Tremorous, Irritable, Sweating


HEENTM: Yes: EOMI, Hearing grossly Normal, Normal ENT Inspection, Normocephalic

, Normal Voice, COMFORT, Pharynx Normal, Tm's normal


Respiratory: Yes: Chest Non-Tender, Lungs Clear, Normal Breath Sounds, No 

Respiratory Distress, No Accessory Muscle Use


Neck: Yes: No masses,lesions,Nodules, Supple, Trachea in good position


Breast: Yes: Within Normal Limits


Cardiology: Yes: Regular Rhythm, Regular Rate, S1, S2


Abdominal: Yes: Normal Bowel Sounds, Non Tender, Flat, Soft


Genitourinary: Yes: Within Normal Limits


Back: Yes: Normal Inspection


Musculoskeletal: Yes: full range of Motion, Gait Steady, Pelvis Stable


Extremities: Yes: Normal Capillary Refill, Normal Inspection, Normal Range of 

Motion, Non-Tender


Neurological: Yes: CNs II-XII NML intact, Fully Oriented, Alert, Motor Strength 

5/5, Normal Mood/Affect, Normal Response


Integumentary: Yes: Normal Color, Warm


Lymphatic: Yes: Within Normal Limits





- Diagnostic


(1) Alcohol dependence with uncomplicated withdrawal


Current Visit: Yes   Status: Acute   





(2) Cannabis dependence


Current Visit: Yes   Status: Acute   





(3) Low back pain


Current Visit: Yes   Status: Acute   





(4) Nicotine dependence


Current Visit: Yes   Status: Acute   


Qualifiers: 


   Nicotine product type: cigarettes   Substance use status: in withdrawal   

Qualified Code(s): F17.213 - Nicotine dependence, cigarettes, with withdrawal   





(5) Chronic low back pain


Current Visit: Yes   Status: Chronic   


Qualifiers: 


   Back pain laterality: unspecified   Sciatica presence: without sciatica   

Qualified Code(s): M54.5 - Low back pain; G89.29 - Other chronic pain   





(6) Hyperlipidemia


Current Visit: Yes   Status: Chronic   


Qualifiers: 


   Hyperlipidemia type: unspecified   Qualified Code(s): E78.5 - Hyperlipidemia

, unspecified   





(7) Hypertension


Current Visit: Yes   Status: Chronic   


Qualifiers: 


   Hypertension type: essential hypertension   Qualified Code(s): I10 - 

Essential (primary) hypertension   





Cleared for Admission [facility]





- Detox or Rehab


[facility] Level of Care: Medically Managed


Detox Regimen/Protocol: Librium


Claeared for Rehab Admission: No





Screened but not Admitted





- Documentation of Visit


Screened but not Admitted: No





Breathalyzer





- Breathalyzer


Breathalyzer: 0 (3 am this morning)





Urine Drug Screen





- Test Device


Lot number: [id number]


Expiration date: 21





- Control


Is test valid?: Yes





- Results


Drug screen NEGATIVE: No


Urine drug screen results: THC-Marijuana, KAYLYNN-Cocaine, MOP-Opiates, BZO-

Benzodiazepines





Inpatient Rehab Admission





- Rehab Decision to Admit


Inpatient rehab admission?: No

## 2020-02-27 RX ADMIN — ATORVASTATIN CALCIUM SCH: 20 TABLET, FILM COATED ORAL at 22:38

## 2020-02-27 RX ADMIN — AMLODIPINE BESYLATE SCH: 10 TABLET ORAL at 09:49

## 2020-02-27 RX ADMIN — LISINOPRIL SCH: 10 TABLET ORAL at 09:49

## 2020-02-27 RX ADMIN — HYDROCHLOROTHIAZIDE SCH MG: 25 TABLET ORAL at 10:35

## 2020-02-27 RX ADMIN — Medication SCH TAB: at 10:35

## 2020-02-27 RX ADMIN — NIFEDIPINE SCH: 90 TABLET, FILM COATED, EXTENDED RELEASE ORAL at 09:50

## 2020-02-27 RX ADMIN — Medication SCH: at 22:39

## 2020-02-27 NOTE — PN
S CIWA





- CIWA Score


Nausea/Vomitin-Mild Nausea/No Vomiting


Muscle Tremors: 3


Anxiety: 2


Agitation: 0-Normal Activity


Paroxysmal Sweats: 2


Orientation: 0-Oriented


Tacttile Disturbances: 0-None


Auditory Disturbances: 0-None


Visual Disturbances: 2-Mild Sensitivity


Headache: 2-Mild


CIWA-Ar Total Score: 12





[facility] Progress Note (SOAP)


Subjective: 





64 years old male admitted on 20 for benzo withdrawal sx management


treating with librium detox regiment


reports sleepy with low bp 102/68


hold some of antihypertensive medications 





continue bp monitoring may have once daily antihypertensive medications that 

were hold this morning if appropriated


Objective: 





20 14:23


 Vital Signs











Temperature  97.8 F   20 12:50


 


Pulse Rate  93 H  20 12:50


 


Respiratory Rate  18   20 12:50


 


Blood Pressure  131/83   20 12:50


 


O2 Sat by Pulse Oximetry (%)      








 Laboratory Last Values











WBC  5.2 K/mm3 (4.0-10.0)   20  13:20    


 


RBC  4.73 M/mm3 (4.00-5.60)   20  13:20    


 


Hgb  13.1 GM/dL (11.7-16.9)   20  13:20    


 


Hct  40.4 % (35.4-49)   20  13:20    


 


MCV  85.4 fl (80-96)   20  13:20    


 


MCH  27.7 pg (25.7-33.7)   20  13:20    


 


MCHC  32.4 g/dl (32.0-35.9)   20  13:20    


 


RDW  14.2 % (11.9-15.9)   20  13:20    


 


Plt Count  244 K/MM3 (134-434)  D 20  13:20    


 


MPV  9.7 fl (7.5-11.1)   20  13:20    


 


Sodium  139 mmol/L (136-145)   20  13:20    


 


Potassium  4.0 mmol/L (3.5-5.1)   20  13:20    


 


Chloride  106 mmol/L ()   20  13:20    


 


Carbon Dioxide  29 mmol/L (21-32)   20  13:20    


 


Anion Gap  4 MMOL/L (8-16)  L  20  13:20    


 


BUN  25.5 mg/dL (7-18)  H  20  13:20    


 


Creatinine  2.0 mg/dL (0.55-1.3)  H  20  13:20    


 


Est GFR (CKD-EPI)AfAm  40.26   20  13:20    


 


Est GFR (CKD-EPI)NonAf  34.74   20  13:20    


 


Random Glucose  104 mg/dL ()   20  13:20    


 


Calcium  9.0 mg/dL (8.5-10.1)   20  13:20    


 


Total Bilirubin  0.6 mg/dL (0.2-1)   20  13:20    


 


AST  51 U/L (15-37)  H  20  13:20    


 


ALT  51 U/L (13-61)   20  13:20    


 


Alkaline Phosphatase  83 U/L ()   20  13:20    


 


Total Protein  8.5 g/dl (6.4-8.2)  H  20  13:20    


 


Albumin  3.8 g/dl (3.4-5.0)   20  13:20    


 


RPR Titer  Nonreactive  (NONREACTIVE)   20  13:20    








lab noted


bun and creatinine elevation 


 renal function panel


20 14:24





Assessment: 





20 14:24


benzo withdrawal 


Plan: 





librium regiment

## 2020-02-28 LAB
ALBUMIN SERPL-MCNC: 2.9 G/DL (ref 3.4–5)
BUN SERPL-MCNC: 27.5 MG/DL (ref 7–18)
CALCIUM SERPL-MCNC: 8.3 MG/DL (ref 8.5–10.1)
CHLORIDE SERPL-SCNC: 106 MMOL/L (ref 98–107)
CO2 SERPL-SCNC: 31 MMOL/L (ref 21–32)
CREAT SERPL-MCNC: 1.7 MG/DL (ref 0.55–1.3)
GLUCOSE SERPL-MCNC: 120 MG/DL (ref 74–106)
PHOSPHATE SERPL-MCNC: 1.9 MG/DL (ref 2.5–4.9)
POTASSIUM SERPLBLD-SCNC: 4 MMOL/L (ref 3.5–5.1)
SODIUM SERPL-SCNC: 139 MMOL/L (ref 136–145)

## 2020-02-28 RX ADMIN — LISINOPRIL SCH MG: 10 TABLET ORAL at 10:14

## 2020-02-28 RX ADMIN — HYDROCHLOROTHIAZIDE SCH MG: 25 TABLET ORAL at 10:14

## 2020-02-28 RX ADMIN — Medication PRN MG: at 22:21

## 2020-02-28 RX ADMIN — ATORVASTATIN CALCIUM SCH MG: 20 TABLET, FILM COATED ORAL at 22:21

## 2020-02-28 RX ADMIN — Medication SCH MG: at 22:21

## 2020-02-28 RX ADMIN — AMLODIPINE BESYLATE SCH MG: 10 TABLET ORAL at 10:14

## 2020-02-28 RX ADMIN — NIFEDIPINE SCH MG: 90 TABLET, FILM COATED, EXTENDED RELEASE ORAL at 10:14

## 2020-02-28 RX ADMIN — Medication SCH TAB: at 10:14

## 2020-02-28 NOTE — PN
S CIWA





- CIWA Score


Nausea/Vomitin-No Nausea/No Vomiting


Muscle Tremors: None


Anxiety: 1-Mildly Anxious


Agitation: 0-Normal Activity


Paroxysmal Sweats: No Perspiration


Orientation: 0-Oriented


Tacttile Disturbances: 0-None


Auditory Disturbances: 0-None


Visual Disturbances: 0-None


Headache: 0-None Present


CIWA-Ar Total Score: 1





[facility] Progress Note (SOAP)


Subjective: 





Pt questioning need for repeat labs


Objective: 





20 10:27


 Laboratory Tests











  20





  13:20 13:20 13:20


 


WBC  5.2  


 


RBC  4.73  


 


Hgb  13.1  


 


Hct  40.4  


 


MCV  85.4  


 


MCH  27.7  


 


MCHC  32.4  


 


RDW  14.2  


 


Plt Count  244  D  


 


MPV  9.7  


 


Sodium   139 


 


Potassium   4.0 


 


Chloride   106 


 


Carbon Dioxide   29 


 


Anion Gap   4 L 


 


BUN   25.5 H 


 


Creatinine   2.0 H 


 


Est GFR (CKD-EPI)AfAm   40.26 


 


Est GFR (CKD-EPI)NonAf   34.74 


 


Random Glucose   104 


 


Calcium   9.0 


 


Total Bilirubin   0.6 


 


AST   51 H 


 


ALT   51 


 


Alkaline Phosphatase   83 


 


Total Protein   8.5 H 


 


Albumin   3.8 


 


RPR Titer    Nonreactive








 Vital Signs











Temperature  97.3 F L  20 08:48


 


Pulse Rate  79   20 08:48


 


Respiratory Rate  18   20 08:48


 


Blood Pressure  116/83   20 08:48


 


O2 Sat by Pulse Oximetry (%)      








PE


gnl: WDWN, in no distress


MS: awake, alert, nl language function


Motor: nl


Coord: nl


Gait: nl


Assessment: 





20 10:28


1. Alcohol use disorder


2. elevated BUN/creat


Plan: 





1. continue Librium withdrawal protocol


2. repeat labs; renal function


3. HTN, under good control

## 2020-02-29 RX ADMIN — ATORVASTATIN CALCIUM SCH MG: 20 TABLET, FILM COATED ORAL at 22:30

## 2020-02-29 RX ADMIN — LISINOPRIL SCH MG: 10 TABLET ORAL at 11:24

## 2020-02-29 RX ADMIN — HYDROCHLOROTHIAZIDE SCH MG: 25 TABLET ORAL at 11:23

## 2020-02-29 RX ADMIN — NIFEDIPINE SCH MG: 90 TABLET, FILM COATED, EXTENDED RELEASE ORAL at 11:24

## 2020-02-29 RX ADMIN — Medication SCH MG: at 22:30

## 2020-02-29 RX ADMIN — AMLODIPINE BESYLATE SCH MG: 10 TABLET ORAL at 11:23

## 2020-02-29 RX ADMIN — Medication SCH TAB: at 11:23

## 2020-02-29 RX ADMIN — Medication PRN MG: at 22:30

## 2020-02-29 NOTE — PN
S CIWA





- CIWA Score


Nausea/Vomitin-No Nausea/No Vomiting


Muscle Tremors: None


Anxiety: 3


Agitation: 0-Normal Activity


Paroxysmal Sweats: 3


Orientation: 0-Oriented


Tacttile Disturbances: 0-None


Auditory Disturbances: 0-None


Visual Disturbances: 0-None


Headache: 2-Mild


CIWA-Ar Total Score: 8





[facility] Progress Note (SOAP)


Subjective: 





c/o headache, anxiety, and sweats.


Objective: 





20 11:06


 Vital Signs











  20





  03:30 07:02 08:38


 


Temperature  97.6 F 97.6 F


 


Pulse Rate  79 75


 


Respiratory 18 16 18





Rate   


 


Blood Pressure  117/86 148/94








 Laboratory Last Values











WBC  5.2 K/mm3 (4.0-10.0)   20  13:20    


 


RBC  4.73 M/mm3 (4.00-5.60)   20  13:20    


 


Hgb  13.1 GM/dL (11.7-16.9)   20  13:20    


 


Hct  40.4 % (35.4-49)   20  13:20    


 


MCV  85.4 fl (80-96)   20  13:20    


 


MCH  27.7 pg (25.7-33.7)   20  13:20    


 


MCHC  32.4 g/dl (32.0-35.9)   20  13:20    


 


RDW  14.2 % (11.9-15.9)   20  13:20    


 


Plt Count  244 K/MM3 (134-434)  D 20  13:20    


 


MPV  9.7 fl (7.5-11.1)   20  13:20    


 


Sodium  139 mmol/L (136-145)   20  10:35    


 


Potassium  4.0 mmol/L (3.5-5.1)   20  10:35    


 


Chloride  106 mmol/L ()   20  10:35    


 


Carbon Dioxide  31 mmol/L (21-32)   20  10:35    


 


Anion Gap  4 MMOL/L (8-16)  L  20  13:20    


 


BUN  27.5 mg/dL (7-18)  H  20  10:35    


 


Creatinine  1.7 mg/dL (0.55-1.3)  H  20  10:35    


 


Est GFR (CKD-EPI)AfAm  40.26   20  13:20    


 


Est GFR (CKD-EPI)NonAf  34.74   20  13:20    


 


Random Glucose  120 mg/dL ()  H  20  10:35    


 


Calcium  8.3 mg/dL (8.5-10.1)  L  20  10:35    


 


Phosphorus  1.9 mg/dL (2.5-4.9)  L  20  10:35    


 


Total Bilirubin  0.6 mg/dL (0.2-1)   20  13:20    


 


AST  51 U/L (15-37)  H  20  13:20    


 


ALT  51 U/L (13-61)   20  13:20    


 


Alkaline Phosphatase  83 U/L ()   20  13:20    


 


Total Protein  8.5 g/dl (6.4-8.2)  H  20  13:20    


 


Albumin  2.9 g/dl (3.4-5.0)  L  20  10:35    


 


RPR Titer  Nonreactive  (NONREACTIVE)   20  13:20    








Labs noted with elevated BUN/Creat. Refused labs repeat.


Assessment: 





20 11:07


AOX3, in no respiratory distress.


Full ROM, ambulating in the unit.


Withdrawal symptoms.


Plan: 





continue detox.


Increase fluids.

## 2020-03-01 RX ADMIN — Medication PRN MG: at 22:35

## 2020-03-01 RX ADMIN — LISINOPRIL SCH MG: 10 TABLET ORAL at 10:54

## 2020-03-01 RX ADMIN — HYDROCHLOROTHIAZIDE SCH MG: 25 TABLET ORAL at 10:54

## 2020-03-01 RX ADMIN — AMLODIPINE BESYLATE SCH MG: 10 TABLET ORAL at 10:54

## 2020-03-01 RX ADMIN — Medication SCH MG: at 22:35

## 2020-03-01 RX ADMIN — Medication SCH TAB: at 10:54

## 2020-03-01 RX ADMIN — NIFEDIPINE SCH MG: 90 TABLET, FILM COATED, EXTENDED RELEASE ORAL at 10:54

## 2020-03-01 RX ADMIN — ATORVASTATIN CALCIUM SCH MG: 20 TABLET, FILM COATED ORAL at 22:35

## 2020-03-01 NOTE — PN
[facility] Progress Note


Note: 





patient wants to stay overnight till tomorrow 03/02/20 that his family will 

pick him up around 8-9 am


change discharge day to 03/02/20

## 2020-03-01 NOTE — DS
[facility] Detox Discharge Summary


Admission Date: 


20





Discharge Date: 20





- History


Present History: Sedative Dependence


Additional Comments: 





64 years old male admitted on 20 for benzo withdrawal sx management


treated with librium detox regiment


Mr Gentile prefers to leave the detox today instead of as estimated discharge 

day of 20


alert oriented x 3 speech clearly coherently ambulating steady gait





cardiac s1s2 regular rate rhythm


ekg indicated left atrial enlargement


denies chest pain no dizziness no shortness of breath


respiratory clear lungs bilaterally on auscultation


extremities full range of motion


Pertinent Past History: 





time for discharge 34 minutes





patient refuses aftercare arrangement that "I do not want to go anywhere" 


patient has not attend groups nor meetings while in detox





- Physical Exam Results


Vital Signs: 


 Vital Signs











Temperature  98.1 F   20 05:52


 


Pulse Rate  84   20 05:52


 


Respiratory Rate  18   20 05:52


 


Blood Pressure  104/68   20 05:52


 


O2 Sat by Pulse Oximetry (%)      











Pertinent Admission Physical Exam Findings: 





benzo withdrawal 


 Vital Signs











Temperature  98.1 F   20 05:52


 


Pulse Rate  84   20 05:52


 


Respiratory Rate  18   20 05:52


 


Blood Pressure  104/68   20 05:52


 


O2 Sat by Pulse Oximetry (%)      








 Laboratory Last Values











WBC  5.2 K/mm3 (4.0-10.0)   20  13:20    


 


RBC  4.73 M/mm3 (4.00-5.60)   20  13:20    


 


Hgb  13.1 GM/dL (11.7-16.9)   20  13:20    


 


Hct  40.4 % (35.4-49)   20  13:20    


 


MCV  85.4 fl (80-96)   20  13:20    


 


MCH  27.7 pg (25.7-33.7)   20  13:20    


 


MCHC  32.4 g/dl (32.0-35.9)   20  13:20    


 


RDW  14.2 % (11.9-15.9)   20  13:20    


 


Plt Count  244 K/MM3 (134-434)  D 20  13:20    


 


MPV  9.7 fl (7.5-11.1)   20  13:20    


 


Sodium  139 mmol/L (136-145)   20  10:35    


 


Potassium  4.0 mmol/L (3.5-5.1)   20  10:35    


 


Chloride  106 mmol/L ()   20  10:35    


 


Carbon Dioxide  31 mmol/L (21-32)   20  10:35    


 


Anion Gap  4 MMOL/L (8-16)  L  20  13:20    


 


BUN  27.5 mg/dL (7-18)  H  20  10:35    


 


Creatinine  1.7 mg/dL (0.55-1.3)  H  20  10:35    


 


Est GFR (CKD-EPI)AfAm  40.26   20  13:20    


 


Est GFR (CKD-EPI)NonAf  34.74   20  13:20    


 


Random Glucose  120 mg/dL ()  H  20  10:35    


 


Calcium  8.3 mg/dL (8.5-10.1)  L  20  10:35    


 


Phosphorus  1.9 mg/dL (2.5-4.9)  L  20  10:35    


 


Total Bilirubin  0.6 mg/dL (0.2-1)   20  13:20    


 


AST  51 U/L (15-37)  H  20  13:20    


 


ALT  51 U/L (13-61)   20  13:20    


 


Alkaline Phosphatase  83 U/L ()   20  13:20    


 


Total Protein  8.5 g/dl (6.4-8.2)  H  20  13:20    


 


Albumin  2.9 g/dl (3.4-5.0)  L  20  10:35    


 


RPR Titer  Nonreactive  (NONREACTIVE)   20  13:20    








lab noted


renal insufficient





- Treatment


Hospital Course: Detox Protocol Followed, Detoxed Safely, Responded well, 

Discharged Condition Good, Rehab Referral Accepted


Patient has Accepted a Rehab Referral to: community support approach





- Medication


Discharge Medications: 


Ambulatory Orders





Amlodipine Besylate [Norvasc -] 10 mg PO DAILY #30 tablet 18 


Hydrochlorothiazide 25 mg PO DAILY 19 


Atorvastatin Ca [Lipitor] 20 mg PO HS 20 


Isosorbide Mononitrate [Isosorbide Mononitrate ER] 60 mg PO DAILY 20 


Lisinopril 10 mg PO DAILY 20 


Nifedipine ER [Procardia Xl -] 90 mg PO DAILY 20 











- Diagnosis


(1) Sedative, hypnotic or anxiolytic dependence, uncomplicated


Current Visit: Yes   Status: Acute   





(2) Nicotine dependence


Current Visit: Yes   Status: Acute   


Qualifiers: 


   Nicotine product type: cigarettes   Substance use status: in withdrawal   

Qualified Code(s): F17.213 - Nicotine dependence, cigarettes, with withdrawal   





(3) Hypertension


Current Visit: Yes   Status: Chronic   


Qualifiers: 


   Hypertension type: essential hypertension   Qualified Code(s): I10 - 

Essential (primary) hypertension   





(4) Hyperlipidemia


Current Visit: Yes   Status: Chronic   


Qualifiers: 


   Hyperlipidemia type: pure hypertriglyceridemia   Qualified Code(s): E78.1 - 

Pure hyperglyceridemia   





(5) Chronic renal insufficiency, stage III (moderate)


Current Visit: Yes   Status: Chronic   





(6) Substance induced mood disorder


Current Visit: Yes   Status: Suspected   





- AMA


Did Patient Leave Against Medical Advice: No





CIWA Score





- CIWA Score


Nausea/Vomitin-No Nausea/No Vomiting


Muscle Tremors: None


Anxiety: 2


Agitation: 0-Normal Activity


Paroxysmal Sweats: 1-Minimal Palms Moist


Orientation: 0-Oriented


Tacttile Disturbances: 0-None


Auditory Disturbances: 0-None


Visual Disturbances: 0-None


Headache: 0-None Present


CIWA-Ar Total Score: 3

## 2020-03-02 VITALS — HEART RATE: 84 BPM | TEMPERATURE: 97.6 F | DIASTOLIC BLOOD PRESSURE: 76 MMHG | SYSTOLIC BLOOD PRESSURE: 121 MMHG

## 2020-03-02 NOTE — DS
[facility] Detox Discharge Summary


Admission Date: 


20





Discharge Date: 20





- History


Present History: Sedative Dependence


Additional Comments: 





Mr Gentile has completed the librium regiment and is tolerated well


alert oriented x 3


not attend groups and meetings while in detox


refuses aftercare referral 





cardiac s1s2 regular rate rhythm


ekg indicated left atrial enlargement


respiratory clear lungs bilaterally on auscultation


skin warm and dry


Pertinent Past History: 





time for discharge 33 minutes





Mr Gentile prefers follow up with primary care provider for medical and mental 

issues





- Physical Exam Results


Vital Signs: 


 Vital Signs











Temperature  97.6 F   20 06:17


 


Pulse Rate  84   20 06:17


 


Respiratory Rate  18   20 06:44


 


Blood Pressure  121/76   20 06:17


 


O2 Sat by Pulse Oximetry (%)      











Pertinent Admission Physical Exam Findings: 





benzo withdrawa


 Laboratory Last Values











WBC  5.2 K/mm3 (4.0-10.0)   20  13:20    


 


RBC  4.73 M/mm3 (4.00-5.60)   20  13:20    


 


Hgb  13.1 GM/dL (11.7-16.9)   20  13:20    


 


Hct  40.4 % (35.4-49)   20  13:20    


 


MCV  85.4 fl (80-96)   20  13:20    


 


MCH  27.7 pg (25.7-33.7)   20  13:20    


 


MCHC  32.4 g/dl (32.0-35.9)   20  13:20    


 


RDW  14.2 % (11.9-15.9)   20  13:20    


 


Plt Count  244 K/MM3 (134-434)  D 20  13:20    


 


MPV  9.7 fl (7.5-11.1)   20  13:20    


 


Sodium  139 mmol/L (136-145)   20  10:35    


 


Potassium  4.0 mmol/L (3.5-5.1)   20  10:35    


 


Chloride  106 mmol/L ()   20  10:35    


 


Carbon Dioxide  31 mmol/L (21-32)   20  10:35    


 


Anion Gap  4 MMOL/L (8-16)  L  20  13:20    


 


BUN  27.5 mg/dL (7-18)  H  20  10:35    


 


Creatinine  1.7 mg/dL (0.55-1.3)  H  20  10:35    


 


Est GFR (CKD-EPI)AfAm  40.26   20  13:20    


 


Est GFR (CKD-EPI)NonAf  34.74   20  13:20    


 


Random Glucose  120 mg/dL ()  H  20  10:35    


 


Calcium  8.3 mg/dL (8.5-10.1)  L  20  10:35    


 


Phosphorus  1.9 mg/dL (2.5-4.9)  L  20  10:35    


 


Total Bilirubin  0.6 mg/dL (0.2-1)   20  13:20    


 


AST  51 U/L (15-37)  H  20  13:20    


 


ALT  51 U/L (13-61)   20  13:20    


 


Alkaline Phosphatase  83 U/L ()   20  13:20    


 


Total Protein  8.5 g/dl (6.4-8.2)  H  20  13:20    


 


Albumin  2.9 g/dl (3.4-5.0)  L  20  10:35    


 


RPR Titer  Nonreactive  (NONREACTIVE)   20  13:20    








lab noted


renal insufficiency III 





- Treatment


Hospital Course: Detox Protocol Followed, Detoxed Safely, Responded well, 

Discharged Condition Good, Rehab Referral Accepted


Patient has Accepted a Rehab Referral to: community support





- Medication


Discharge Medications: 


Ambulatory Orders





Amlodipine Besylate [Norvasc -] 10 mg PO DAILY #30 tablet 18 


Hydrochlorothiazide 25 mg PO DAILY 19 


Atorvastatin Ca [Lipitor] 20 mg PO HS 20 


Isosorbide Mononitrate [Isosorbide Mononitrate ER] 60 mg PO DAILY 20 


Lisinopril 10 mg PO DAILY 20 


Nifedipine ER [Procardia XL -] 90 mg PO DAILY 20 











- Diagnosis


(1) Sedative, hypnotic or anxiolytic dependence, uncomplicated


Status: Acute   





(2) Nicotine dependence


Status: Acute   


Qualifiers: 


   Nicotine product type: cigarettes   Substance use status: in withdrawal   

Qualified Code(s): F17.213 - Nicotine dependence, cigarettes, with withdrawal   





(3) Hypertension


Status: Chronic   


Qualifiers: 


   Hypertension type: essential hypertension   Qualified Code(s): I10 - 

Essential (primary) hypertension   





(4) Hyperlipidemia


Status: Chronic   


Qualifiers: 


   Hyperlipidemia type: pure hypertriglyceridemia   Qualified Code(s): E78.1 - 

Pure hyperglyceridemia   





(5) Chronic renal insufficiency, stage III (moderate)


Status: Chronic   





(6) Substance induced mood disorder


Status: Suspected   





- AMA


Did Patient Leave Against Medical Advice: No





CIWA Score





- CIWA Score


Nausea/Vomitin-No Nausea/No Vomiting


Muscle Tremors: None


Anxiety: 1-Mildly Anxious


Agitation: 0-Normal Activity


Paroxysmal Sweats: No Perspiration


Orientation: 0-Oriented


Tacttile Disturbances: 0-None


Auditory Disturbances: 0-None


Visual Disturbances: 0-None


Headache: 0-None Present


CIWA-Ar Total Score: 1

## 2020-06-23 ENCOUNTER — HOSPITAL ENCOUNTER (INPATIENT)
Dept: HOSPITAL 74 - YASAS | Age: 65
LOS: 3 days | Discharge: HOME | End: 2020-06-26
Attending: ALLERGY & IMMUNOLOGY | Admitting: ALLERGY & IMMUNOLOGY
Payer: COMMERCIAL

## 2020-06-23 VITALS — BODY MASS INDEX: 23.1 KG/M2

## 2020-06-23 DIAGNOSIS — G89.29: ICD-10-CM

## 2020-06-23 DIAGNOSIS — F14.20: ICD-10-CM

## 2020-06-23 DIAGNOSIS — R79.89: ICD-10-CM

## 2020-06-23 DIAGNOSIS — I45.81: ICD-10-CM

## 2020-06-23 DIAGNOSIS — M54.5: ICD-10-CM

## 2020-06-23 DIAGNOSIS — F12.20: ICD-10-CM

## 2020-06-23 DIAGNOSIS — F10.230: Primary | ICD-10-CM

## 2020-06-23 DIAGNOSIS — E78.1: ICD-10-CM

## 2020-06-23 DIAGNOSIS — F17.210: ICD-10-CM

## 2020-06-23 DIAGNOSIS — Z86.19: ICD-10-CM

## 2020-06-23 DIAGNOSIS — I10: ICD-10-CM

## 2020-06-23 DIAGNOSIS — Z59.0: ICD-10-CM

## 2020-06-23 DIAGNOSIS — F19.24: ICD-10-CM

## 2020-06-23 LAB
ALBUMIN SERPL-MCNC: 3.5 G/DL (ref 3.4–5)
ALP SERPL-CCNC: 104 U/L (ref 45–117)
ALT SERPL-CCNC: 39 U/L (ref 13–61)
ANION GAP SERPL CALC-SCNC: 10 MMOL/L (ref 8–16)
AST SERPL-CCNC: 33 U/L (ref 15–37)
BILIRUB SERPL-MCNC: 0.3 MG/DL (ref 0.2–1)
BUN SERPL-MCNC: 30.2 MG/DL (ref 7–18)
CALCIUM SERPL-MCNC: 9 MG/DL (ref 8.5–10.1)
CHLORIDE SERPL-SCNC: 108 MMOL/L (ref 98–107)
CO2 SERPL-SCNC: 25 MMOL/L (ref 21–32)
CREAT SERPL-MCNC: 1.9 MG/DL (ref 0.55–1.3)
DEPRECATED RDW RBC AUTO: 15.1 % (ref 11.9–15.9)
GLUCOSE SERPL-MCNC: 108 MG/DL (ref 74–106)
HCT VFR BLD CALC: 38.6 % (ref 35.4–49)
HGB BLD-MCNC: 12.1 GM/DL (ref 11.7–16.9)
MCH RBC QN AUTO: 27.5 PG (ref 25.7–33.7)
MCHC RBC AUTO-ENTMCNC: 31.5 G/DL (ref 32–35.9)
MCV RBC: 87.2 FL (ref 80–96)
PLATELET # BLD AUTO: 227 K/MM3 (ref 134–434)
PMV BLD: 9.8 FL (ref 7.5–11.1)
POTASSIUM SERPLBLD-SCNC: 3.6 MMOL/L (ref 3.5–5.1)
PROT SERPL-MCNC: 7.7 G/DL (ref 6.4–8.2)
RBC # BLD AUTO: 4.42 M/MM3 (ref 4–5.6)
SODIUM SERPL-SCNC: 143 MMOL/L (ref 136–145)
WBC # BLD AUTO: 5.9 K/MM3 (ref 4–10)

## 2020-06-23 PROCEDURE — U0003 INFECTIOUS AGENT DETECTION BY NUCLEIC ACID (DNA OR RNA); SEVERE ACUTE RESPIRATORY SYNDROME CORONAVIRUS 2 (SARS-COV-2) (CORONAVIRUS DISEASE [COVID-19]), AMPLIFIED PROBE TECHNIQUE, MAKING USE OF HIGH THROUGHPUT TECHNOLOGIES AS DESCRIBED BY CMS-2020-01-R: HCPCS

## 2020-06-23 PROCEDURE — HZ2ZZZZ DETOXIFICATION SERVICES FOR SUBSTANCE ABUSE TREATMENT: ICD-10-PCS | Performed by: ALLERGY & IMMUNOLOGY

## 2020-06-23 RX ADMIN — Medication SCH: at 23:30

## 2020-06-23 RX ADMIN — ATORVASTATIN CALCIUM SCH MG: 20 TABLET, FILM COATED ORAL at 21:39

## 2020-06-23 RX ADMIN — Medication SCH: at 13:57

## 2020-06-23 RX ADMIN — LISINOPRIL SCH MG: 10 TABLET ORAL at 21:39

## 2020-06-23 RX ADMIN — AMLODIPINE BESYLATE SCH MG: 10 TABLET ORAL at 21:39

## 2020-06-23 RX ADMIN — NICOTINE SCH: 14 PATCH, EXTENDED RELEASE TRANSDERMAL at 13:56

## 2020-06-23 SDOH — ECONOMIC STABILITY - HOUSING INSECURITY: HOMELESSNESS: Z59.0

## 2020-06-24 RX ADMIN — Medication SCH TAB: at 10:36

## 2020-06-24 RX ADMIN — Medication SCH MG: at 22:00

## 2020-06-24 RX ADMIN — AMLODIPINE BESYLATE SCH MG: 10 TABLET ORAL at 10:36

## 2020-06-24 RX ADMIN — ISOSORBIDE MONONITRATE SCH MG: 60 TABLET, EXTENDED RELEASE ORAL at 10:58

## 2020-06-24 RX ADMIN — LISINOPRIL SCH MG: 10 TABLET ORAL at 10:36

## 2020-06-24 RX ADMIN — NICOTINE SCH: 14 PATCH, EXTENDED RELEASE TRANSDERMAL at 10:36

## 2020-06-24 RX ADMIN — ATORVASTATIN CALCIUM SCH MG: 20 TABLET, FILM COATED ORAL at 22:00

## 2020-06-24 RX ADMIN — Medication SCH: at 23:00

## 2020-06-25 RX ADMIN — Medication SCH TAB: at 10:17

## 2020-06-25 RX ADMIN — AMLODIPINE BESYLATE SCH MG: 10 TABLET ORAL at 10:16

## 2020-06-25 RX ADMIN — Medication SCH: at 23:00

## 2020-06-25 RX ADMIN — ISOSORBIDE MONONITRATE SCH MG: 60 TABLET, EXTENDED RELEASE ORAL at 10:17

## 2020-06-25 RX ADMIN — NICOTINE SCH: 14 PATCH, EXTENDED RELEASE TRANSDERMAL at 10:16

## 2020-06-25 RX ADMIN — LISINOPRIL SCH MG: 10 TABLET ORAL at 10:16

## 2020-06-25 RX ADMIN — ATORVASTATIN CALCIUM SCH: 20 TABLET, FILM COATED ORAL at 23:00

## 2020-06-26 VITALS — SYSTOLIC BLOOD PRESSURE: 153 MMHG | DIASTOLIC BLOOD PRESSURE: 89 MMHG | HEART RATE: 100 BPM

## 2020-06-26 VITALS — TEMPERATURE: 97.8 F

## 2020-06-26 RX ADMIN — LISINOPRIL SCH MG: 10 TABLET ORAL at 11:08

## 2020-06-26 RX ADMIN — AMLODIPINE BESYLATE SCH MG: 10 TABLET ORAL at 11:07

## 2020-06-26 RX ADMIN — NICOTINE SCH: 14 PATCH, EXTENDED RELEASE TRANSDERMAL at 11:09

## 2020-06-26 RX ADMIN — ISOSORBIDE MONONITRATE SCH MG: 60 TABLET, EXTENDED RELEASE ORAL at 11:07

## 2020-06-26 RX ADMIN — Medication SCH: at 11:09
